# Patient Record
Sex: FEMALE | Race: WHITE | NOT HISPANIC OR LATINO | Employment: OTHER | URBAN - METROPOLITAN AREA
[De-identification: names, ages, dates, MRNs, and addresses within clinical notes are randomized per-mention and may not be internally consistent; named-entity substitution may affect disease eponyms.]

---

## 2023-03-14 DIAGNOSIS — F51.01 PRIMARY INSOMNIA: Primary | ICD-10-CM

## 2023-03-14 RX ORDER — AMITRIPTYLINE HYDROCHLORIDE 10 MG/1
TABLET, FILM COATED ORAL
Qty: 180 TABLET | Refills: 0 | Status: SHIPPED | OUTPATIENT
Start: 2023-03-14 | End: 2023-06-01 | Stop reason: SDUPTHER

## 2023-04-27 ENCOUNTER — TELEPHONE (OUTPATIENT)
Dept: PRIMARY CARE | Facility: CLINIC | Age: 68
End: 2023-04-27
Payer: MEDICARE

## 2023-05-02 ENCOUNTER — OFFICE VISIT (OUTPATIENT)
Dept: PRIMARY CARE | Facility: CLINIC | Age: 68
End: 2023-05-02
Payer: MEDICARE

## 2023-05-02 VITALS — DIASTOLIC BLOOD PRESSURE: 70 MMHG | SYSTOLIC BLOOD PRESSURE: 121 MMHG

## 2023-05-02 DIAGNOSIS — R21 RASH: Primary | ICD-10-CM

## 2023-05-02 DIAGNOSIS — L98.9 SKIN LESION: ICD-10-CM

## 2023-05-02 PROCEDURE — 99213 OFFICE O/P EST LOW 20 MIN: CPT | Performed by: STUDENT IN AN ORGANIZED HEALTH CARE EDUCATION/TRAINING PROGRAM

## 2023-05-02 RX ORDER — TRIAMCINOLONE ACETONIDE 1 MG/G
CREAM TOPICAL 2 TIMES DAILY
Qty: 30 G | Refills: 0 | Status: SHIPPED | OUTPATIENT
Start: 2023-05-02

## 2023-05-02 NOTE — PROGRESS NOTES
Subjective   Patient ID: Eh Lee is a 68 y.o. female who presents for rash.    HPI   Sick visit.  She says that she has had a rash on her nose and b/l cheeks for a couple of weeks, not improving. Area is red, dry. No pain. No drainage. No vesicles. No recent change in meds. No change in soap/moisturizers/make-up. Not itchy.  Review of Systems  12-point ROS reviewed and was negative unless otherwise noted in HPI.    Objective   121/70    Physical Exam  GEN: conversant, NAD  HEENT: PERRL, EOMI, wearing a mask  NECK: supple  PULM: unlabored breathing  ABD: ND  NEURO: no new gross focal deficits  EXT: no sig LE edema  PSYCH: appropriate affect  SKIN: mildly erythematous rash on cheeks b/l, not involving eyelids    Assessment/Plan     #Rash: start trial of triamcinolone cream, discussed SE profile, appropriate frequency and duration of use. Referral to dermatology.    RTC 1 mo for routine visit

## 2023-06-01 ENCOUNTER — OFFICE VISIT (OUTPATIENT)
Dept: PRIMARY CARE | Facility: CLINIC | Age: 68
End: 2023-06-01
Payer: MEDICARE

## 2023-06-01 ENCOUNTER — LAB (OUTPATIENT)
Dept: LAB | Facility: LAB | Age: 68
End: 2023-06-01
Payer: MEDICARE

## 2023-06-01 VITALS
BODY MASS INDEX: 19.6 KG/M2 | SYSTOLIC BLOOD PRESSURE: 126 MMHG | HEIGHT: 71 IN | WEIGHT: 140 LBS | DIASTOLIC BLOOD PRESSURE: 70 MMHG

## 2023-06-01 DIAGNOSIS — Z00.00 HEALTHCARE MAINTENANCE: ICD-10-CM

## 2023-06-01 DIAGNOSIS — F51.01 PRIMARY INSOMNIA: ICD-10-CM

## 2023-06-01 DIAGNOSIS — F32.A DEPRESSION, UNSPECIFIED DEPRESSION TYPE: ICD-10-CM

## 2023-06-01 DIAGNOSIS — F41.9 ANXIETY: ICD-10-CM

## 2023-06-01 DIAGNOSIS — Z00.00 HEALTH CARE MAINTENANCE: ICD-10-CM

## 2023-06-01 DIAGNOSIS — Z00.00 ENCOUNTER FOR ANNUAL WELLNESS EXAM IN MEDICARE PATIENT: ICD-10-CM

## 2023-06-01 DIAGNOSIS — E78.2 MIXED HYPERLIPIDEMIA: ICD-10-CM

## 2023-06-01 DIAGNOSIS — K21.9 GASTROESOPHAGEAL REFLUX DISEASE WITHOUT ESOPHAGITIS: ICD-10-CM

## 2023-06-01 DIAGNOSIS — Z00.00 ROUTINE GENERAL MEDICAL EXAMINATION AT HEALTH CARE FACILITY: Primary | ICD-10-CM

## 2023-06-01 DIAGNOSIS — I10 HYPERTENSION, UNSPECIFIED TYPE: ICD-10-CM

## 2023-06-01 LAB
ERYTHROCYTE DISTRIBUTION WIDTH (RATIO) BY AUTOMATED COUNT: 15 % (ref 11.5–14.5)
ERYTHROCYTE MEAN CORPUSCULAR HEMOGLOBIN CONCENTRATION (G/DL) BY AUTOMATED: 30.5 G/DL (ref 32–36)
ERYTHROCYTE MEAN CORPUSCULAR VOLUME (FL) BY AUTOMATED COUNT: 104 FL (ref 80–100)
ERYTHROCYTES (10*6/UL) IN BLOOD BY AUTOMATED COUNT: 3.35 X10E12/L (ref 4–5.2)
HEMATOCRIT (%) IN BLOOD BY AUTOMATED COUNT: 34.7 % (ref 36–46)
HEMOGLOBIN (G/DL) IN BLOOD: 10.6 G/DL (ref 12–16)
LEUKOCYTES (10*3/UL) IN BLOOD BY AUTOMATED COUNT: 5.3 X10E9/L (ref 4.4–11.3)
NRBC (PER 100 WBCS) BY AUTOMATED COUNT: 0 /100 WBC (ref 0–0)
PLATELETS (10*3/UL) IN BLOOD AUTOMATED COUNT: 317 X10E9/L (ref 150–450)

## 2023-06-01 PROCEDURE — 1159F MED LIST DOCD IN RCRD: CPT | Performed by: STUDENT IN AN ORGANIZED HEALTH CARE EDUCATION/TRAINING PROGRAM

## 2023-06-01 PROCEDURE — 1036F TOBACCO NON-USER: CPT | Performed by: STUDENT IN AN ORGANIZED HEALTH CARE EDUCATION/TRAINING PROGRAM

## 2023-06-01 PROCEDURE — 1170F FXNL STATUS ASSESSED: CPT | Performed by: STUDENT IN AN ORGANIZED HEALTH CARE EDUCATION/TRAINING PROGRAM

## 2023-06-01 PROCEDURE — 85027 COMPLETE CBC AUTOMATED: CPT

## 2023-06-01 PROCEDURE — G0439 PPPS, SUBSEQ VISIT: HCPCS | Performed by: STUDENT IN AN ORGANIZED HEALTH CARE EDUCATION/TRAINING PROGRAM

## 2023-06-01 PROCEDURE — 3074F SYST BP LT 130 MM HG: CPT | Performed by: STUDENT IN AN ORGANIZED HEALTH CARE EDUCATION/TRAINING PROGRAM

## 2023-06-01 PROCEDURE — 99214 OFFICE O/P EST MOD 30 MIN: CPT | Performed by: STUDENT IN AN ORGANIZED HEALTH CARE EDUCATION/TRAINING PROGRAM

## 2023-06-01 PROCEDURE — 80061 LIPID PANEL: CPT

## 2023-06-01 PROCEDURE — 84443 ASSAY THYROID STIM HORMONE: CPT

## 2023-06-01 PROCEDURE — 36415 COLL VENOUS BLD VENIPUNCTURE: CPT

## 2023-06-01 PROCEDURE — 1160F RVW MEDS BY RX/DR IN RCRD: CPT | Performed by: STUDENT IN AN ORGANIZED HEALTH CARE EDUCATION/TRAINING PROGRAM

## 2023-06-01 PROCEDURE — 80053 COMPREHEN METABOLIC PANEL: CPT

## 2023-06-01 PROCEDURE — 3078F DIAST BP <80 MM HG: CPT | Performed by: STUDENT IN AN ORGANIZED HEALTH CARE EDUCATION/TRAINING PROGRAM

## 2023-06-01 RX ORDER — BUPROPION HYDROCHLORIDE 75 MG/1
75 TABLET ORAL 2 TIMES DAILY
Qty: 180 TABLET | Refills: 1 | Status: SHIPPED | OUTPATIENT
Start: 2023-06-01 | End: 2023-10-03 | Stop reason: SDUPTHER

## 2023-06-01 RX ORDER — OMEPRAZOLE 40 MG/1
40 CAPSULE, DELAYED RELEASE ORAL
COMMUNITY
Start: 2012-09-26 | End: 2023-06-01 | Stop reason: SDUPTHER

## 2023-06-01 RX ORDER — METHOTREXATE 2.5 MG/1
TABLET ORAL
COMMUNITY
Start: 2017-01-03 | End: 2024-01-11

## 2023-06-01 RX ORDER — TRIAMTERENE/HYDROCHLOROTHIAZID 37.5-25 MG
0.5 TABLET ORAL DAILY
Qty: 45 TABLET | Refills: 1 | Status: SHIPPED | OUTPATIENT
Start: 2023-06-01 | End: 2023-08-14

## 2023-06-01 RX ORDER — DILTIAZEM HYDROCHLORIDE 180 MG/1
180 CAPSULE, EXTENDED RELEASE ORAL EVERY MORNING
Qty: 90 CAPSULE | Refills: 1 | Status: SHIPPED | OUTPATIENT
Start: 2023-06-01 | End: 2023-08-14

## 2023-06-01 RX ORDER — TOFACITINIB 11 MG/1
11 TABLET, FILM COATED, EXTENDED RELEASE ORAL DAILY
COMMUNITY
End: 2024-01-19 | Stop reason: SDUPTHER

## 2023-06-01 RX ORDER — BUPROPION HYDROCHLORIDE 75 MG/1
75 TABLET ORAL 2 TIMES DAILY
COMMUNITY
End: 2023-06-01 | Stop reason: SDUPTHER

## 2023-06-01 RX ORDER — AMITRIPTYLINE HYDROCHLORIDE 10 MG/1
20 TABLET, FILM COATED ORAL NIGHTLY
Qty: 180 TABLET | Refills: 0 | Status: SHIPPED | OUTPATIENT
Start: 2023-06-01 | End: 2023-10-03 | Stop reason: SDUPTHER

## 2023-06-01 RX ORDER — OMEPRAZOLE 40 MG/1
40 CAPSULE, DELAYED RELEASE ORAL
Qty: 90 CAPSULE | Refills: 1 | Status: SHIPPED | OUTPATIENT
Start: 2023-06-01 | End: 2023-08-14

## 2023-06-01 RX ORDER — FLUOXETINE HYDROCHLORIDE 40 MG/1
40 CAPSULE ORAL DAILY
Qty: 90 CAPSULE | Refills: 1 | Status: SHIPPED | OUTPATIENT
Start: 2023-06-01 | End: 2023-10-03 | Stop reason: SDUPTHER

## 2023-06-01 RX ORDER — ALPRAZOLAM 0.5 MG/1
0.5 TABLET ORAL NIGHTLY PRN
COMMUNITY
Start: 2012-11-12 | End: 2023-10-03 | Stop reason: SDUPTHER

## 2023-06-01 ASSESSMENT — ENCOUNTER SYMPTOMS
OCCASIONAL FEELINGS OF UNSTEADINESS: 0
LOSS OF SENSATION IN FEET: 0
DEPRESSION: 0

## 2023-06-01 ASSESSMENT — ACTIVITIES OF DAILY LIVING (ADL)
DRESSING: INDEPENDENT
GROCERY_SHOPPING: INDEPENDENT
BATHING: INDEPENDENT
TAKING_MEDICATION: INDEPENDENT
MANAGING_FINANCES: INDEPENDENT
DOING_HOUSEWORK: INDEPENDENT

## 2023-06-01 NOTE — PROGRESS NOTES
"Subjective   Patient ID: Eh Lee is a 68 y.o. female who presents for Medicare Annual Wellness Visit Subsequent (Med refill).    HPI   Here for follow up. Feels that the rash did not improve with steroids, will see dermatology soon. No longer follows with support group as it fell through, feels well supported with family members and dog. No other issues. Doing her best everyday.    Review of Systems  10 point systems reviewed and negative unless otherwise noted above in HPI    Objective   /70   Ht 1.803 m (5' 11\")   Wt 63.5 kg (140 lb)   BMI 19.53 kg/m²     Physical Exam  Constitutional: No acute distress, pleasant, AOx3  Head: Normocephalic, atraumatic  Eyes: EOMI  Ears: TM Pearly grey light reflex present bilaterally   Cardio: RRR, no gallop or rub   Pulm: CTAB, no wheezes, rales or rhonchi   Abd: Normal bowel sounds, no tenderness to palpation in all 4 quadrant, no masses palpated  Extremities: No edema, atraumatic   Psych: Appropriate mood and affect    Assessment/Plan     #Cervicalgia: Saw occupational therapy, has seen improvement. Has not seen PT.     #Depression:  -continue fluoxetine  -Continue Wellbutrin 75mg BID  -lost follow up to support group for people who lost spouses, patient  passed away 10/2021, feels well supported with family members and dog     #Paroxysmal Afib   #HLD  -no sxs currently   -Will order cardiac calcium score for risk stratification.   -She has not seen her cardiologist Dr. Alfaro for years, again advised f/u.   -She declines anticoagulation for paroxysmal afib, educated on risks     #HTN  -stable on current meds      #RA   -sees Dr. Flores     #osteoporosis   -declines treatment other than calcium-vit D  -has had DEXA with rheum     #anemia  -iron panel WNL  -may be secondary to inflammatory disease vs MTX      #anxiety   -uses alprazolam with good results  -does not need refill presently  -CSA 11/2022    #Rash: triamcinolone cream with no improvement, will " trial sunscreen for time being and regular skin care routine. Referral to dermatology upcoming.      #Health maintenance: Colonoscopy done 2022, 10 year f/u advised. Mammogram UTD 2022. She is receiving Shingrix and COVID-19 vaccinations. Pneumovax given 2022. Follows with ophthalmology. Advised yearly flu shot.     RTC 3-4 months

## 2023-06-01 NOTE — PROGRESS NOTES
"Subjective   Reason for Visit: Eh Lee is an 68 y.o. female here for a Medicare Wellness visit.     Past Medical, Surgical, and Family History reviewed and updated in chart.    Reviewed all medications by prescribing practitioner or clinical pharmacist (such as prescriptions, OTCs, herbal therapies and supplements) and documented in the medical record.    HPI    Patient Care Team:  Wayne Ny MD as PCP - General     Review of Systems    Objective   Vitals:  /70   Ht 1.803 m (5' 11\")   Wt 63.5 kg (140 lb)   BMI 19.53 kg/m²       Physical Exam    Assessment/Plan   Problem List Items Addressed This Visit    None  Visit Diagnoses       Routine general medical examination at health care facility    -  Primary    Primary insomnia        Hypertension, unspecified type        Anxiety            Trainee role: Resident    I saw and evaluated the patient. I personally obtained the key and critical portions of the history and physical exam or was physically present for key and critical portions performed by the trainee. I reviewed the trainee's documentation and discussed the patient with the trainee. I agree with the trainee's medical decision making as documented on the trainee's notes.    Wayne Ny M.D.         "

## 2023-06-02 LAB
ALANINE AMINOTRANSFERASE (SGPT) (U/L) IN SER/PLAS: 11 U/L (ref 7–45)
ALBUMIN (G/DL) IN SER/PLAS: 4.3 G/DL (ref 3.4–5)
ALKALINE PHOSPHATASE (U/L) IN SER/PLAS: 73 U/L (ref 33–136)
ANION GAP IN SER/PLAS: 11 MMOL/L (ref 10–20)
ASPARTATE AMINOTRANSFERASE (SGOT) (U/L) IN SER/PLAS: 18 U/L (ref 9–39)
BILIRUBIN TOTAL (MG/DL) IN SER/PLAS: 0.3 MG/DL (ref 0–1.2)
CALCIUM (MG/DL) IN SER/PLAS: 9.7 MG/DL (ref 8.6–10.6)
CARBON DIOXIDE, TOTAL (MMOL/L) IN SER/PLAS: 29 MMOL/L (ref 21–32)
CHLORIDE (MMOL/L) IN SER/PLAS: 104 MMOL/L (ref 98–107)
CHOLESTEROL (MG/DL) IN SER/PLAS: 233 MG/DL (ref 0–199)
CHOLESTEROL IN HDL (MG/DL) IN SER/PLAS: 103.6 MG/DL
CHOLESTEROL/HDL RATIO: 2.2
CREATININE (MG/DL) IN SER/PLAS: 0.91 MG/DL (ref 0.5–1.05)
GFR FEMALE: 69 ML/MIN/1.73M2
GLUCOSE (MG/DL) IN SER/PLAS: 93 MG/DL (ref 74–99)
LDL: 118 MG/DL (ref 0–99)
POTASSIUM (MMOL/L) IN SER/PLAS: 4.1 MMOL/L (ref 3.5–5.3)
PROTEIN TOTAL: 7.1 G/DL (ref 6.4–8.2)
SODIUM (MMOL/L) IN SER/PLAS: 140 MMOL/L (ref 136–145)
THYROTROPIN (MIU/L) IN SER/PLAS BY DETECTION LIMIT <= 0.05 MIU/L: 1.81 MIU/L (ref 0.44–3.98)
TRIGLYCERIDE (MG/DL) IN SER/PLAS: 59 MG/DL (ref 0–149)
UREA NITROGEN (MG/DL) IN SER/PLAS: 14 MG/DL (ref 6–23)
VLDL: 12 MG/DL (ref 0–40)

## 2023-06-13 DIAGNOSIS — F32.A DEPRESSION, UNSPECIFIED DEPRESSION TYPE: ICD-10-CM

## 2023-06-14 LAB
ALANINE AMINOTRANSFERASE (SGPT) (U/L) IN SER/PLAS: 13 U/L (ref 7–45)
ALBUMIN (G/DL) IN SER/PLAS: 4.6 G/DL (ref 3.4–5)
ALKALINE PHOSPHATASE (U/L) IN SER/PLAS: 84 U/L (ref 33–136)
ANION GAP IN SER/PLAS: 11 MMOL/L (ref 10–20)
ASPARTATE AMINOTRANSFERASE (SGOT) (U/L) IN SER/PLAS: 23 U/L (ref 9–39)
BASOPHILS (10*3/UL) IN BLOOD BY AUTOMATED COUNT: 0.05 X10E9/L (ref 0–0.1)
BASOPHILS/100 LEUKOCYTES IN BLOOD BY AUTOMATED COUNT: 1.3 % (ref 0–2)
BILIRUBIN TOTAL (MG/DL) IN SER/PLAS: 0.3 MG/DL (ref 0–1.2)
C REACTIVE PROTEIN (MG/L) IN SER/PLAS: <0.1 MG/DL
CALCIUM (MG/DL) IN SER/PLAS: 10 MG/DL (ref 8.6–10.6)
CARBON DIOXIDE, TOTAL (MMOL/L) IN SER/PLAS: 31 MMOL/L (ref 21–32)
CHLORIDE (MMOL/L) IN SER/PLAS: 101 MMOL/L (ref 98–107)
CREATININE (MG/DL) IN SER/PLAS: 0.96 MG/DL (ref 0.5–1.05)
EOSINOPHILS (10*3/UL) IN BLOOD BY AUTOMATED COUNT: 0.05 X10E9/L (ref 0–0.7)
EOSINOPHILS/100 LEUKOCYTES IN BLOOD BY AUTOMATED COUNT: 1.3 % (ref 0–6)
ERYTHROCYTE DISTRIBUTION WIDTH (RATIO) BY AUTOMATED COUNT: 14.6 % (ref 11.5–14.5)
ERYTHROCYTE MEAN CORPUSCULAR HEMOGLOBIN CONCENTRATION (G/DL) BY AUTOMATED: 31.7 G/DL (ref 32–36)
ERYTHROCYTE MEAN CORPUSCULAR VOLUME (FL) BY AUTOMATED COUNT: 100 FL (ref 80–100)
ERYTHROCYTES (10*6/UL) IN BLOOD BY AUTOMATED COUNT: 3.62 X10E12/L (ref 4–5.2)
GFR FEMALE: 65 ML/MIN/1.73M2
GLUCOSE (MG/DL) IN SER/PLAS: 84 MG/DL (ref 74–99)
HEMATOCRIT (%) IN BLOOD BY AUTOMATED COUNT: 36.3 % (ref 36–46)
HEMOGLOBIN (G/DL) IN BLOOD: 11.5 G/DL (ref 12–16)
IMMATURE GRANULOCYTES/100 LEUKOCYTES IN BLOOD BY AUTOMATED COUNT: 0.3 % (ref 0–0.9)
LEUKOCYTES (10*3/UL) IN BLOOD BY AUTOMATED COUNT: 4 X10E9/L (ref 4.4–11.3)
LYMPHOCYTES (10*3/UL) IN BLOOD BY AUTOMATED COUNT: 0.73 X10E9/L (ref 1.2–4.8)
LYMPHOCYTES/100 LEUKOCYTES IN BLOOD BY AUTOMATED COUNT: 18.4 % (ref 13–44)
MONOCYTES (10*3/UL) IN BLOOD BY AUTOMATED COUNT: 0.52 X10E9/L (ref 0.1–1)
MONOCYTES/100 LEUKOCYTES IN BLOOD BY AUTOMATED COUNT: 13.1 % (ref 2–10)
NEUTROPHILS (10*3/UL) IN BLOOD BY AUTOMATED COUNT: 2.6 X10E9/L (ref 1.2–7.7)
NEUTROPHILS/100 LEUKOCYTES IN BLOOD BY AUTOMATED COUNT: 65.6 % (ref 40–80)
NRBC (PER 100 WBCS) BY AUTOMATED COUNT: 0 /100 WBC (ref 0–0)
PLATELETS (10*3/UL) IN BLOOD AUTOMATED COUNT: 431 X10E9/L (ref 150–450)
POTASSIUM (MMOL/L) IN SER/PLAS: 4.2 MMOL/L (ref 3.5–5.3)
PROTEIN TOTAL: 7.5 G/DL (ref 6.4–8.2)
SEDIMENTATION RATE, ERYTHROCYTE: 6 MM/H (ref 0–30)
SODIUM (MMOL/L) IN SER/PLAS: 139 MMOL/L (ref 136–145)
UREA NITROGEN (MG/DL) IN SER/PLAS: 12 MG/DL (ref 6–23)

## 2023-06-20 LAB
ANTI-DNA (DS): <1 IU/ML
ANTI-NUCLEAR ANTIBODY (ANA): NEGATIVE
ANTI-SSA: <0.2 AI
ANTI-SSB: <0.2 AI

## 2023-08-09 DIAGNOSIS — K21.9 GASTROESOPHAGEAL REFLUX DISEASE WITHOUT ESOPHAGITIS: ICD-10-CM

## 2023-08-09 DIAGNOSIS — I10 HYPERTENSION, UNSPECIFIED TYPE: ICD-10-CM

## 2023-08-14 RX ORDER — TRIAMTERENE/HYDROCHLOROTHIAZID 37.5-25 MG
0.5 TABLET ORAL DAILY
Qty: 50 TABLET | Refills: 0 | Status: SHIPPED | OUTPATIENT
Start: 2023-08-14 | End: 2023-10-03 | Stop reason: SDUPTHER

## 2023-08-14 RX ORDER — DILTIAZEM HYDROCHLORIDE 180 MG/1
180 CAPSULE, EXTENDED RELEASE ORAL EVERY MORNING
Qty: 100 CAPSULE | Refills: 0 | Status: SHIPPED | OUTPATIENT
Start: 2023-08-14 | End: 2023-10-03 | Stop reason: SDUPTHER

## 2023-08-14 RX ORDER — OMEPRAZOLE 40 MG/1
40 CAPSULE, DELAYED RELEASE ORAL
Qty: 100 CAPSULE | Refills: 0 | Status: SHIPPED | OUTPATIENT
Start: 2023-08-14 | End: 2023-10-03 | Stop reason: SDUPTHER

## 2023-09-08 PROBLEM — I25.10 3-VESSEL CAD: Status: ACTIVE | Noted: 2023-09-08

## 2023-09-08 PROBLEM — D64.9 ANEMIA: Status: ACTIVE | Noted: 2023-09-08

## 2023-09-08 PROBLEM — F32.A DEPRESSION: Status: ACTIVE | Noted: 2023-09-08

## 2023-09-08 PROBLEM — I10 HYPERTENSION, BENIGN: Status: ACTIVE | Noted: 2023-09-08

## 2023-09-08 PROBLEM — G45.9 TIA (TRANSIENT ISCHEMIC ATTACK): Status: ACTIVE | Noted: 2023-09-08

## 2023-09-08 PROBLEM — H90.3 BILATERAL SENSORINEURAL HEARING LOSS: Status: ACTIVE | Noted: 2023-09-08

## 2023-09-08 PROBLEM — M05.79 RHEUMATOID ARTHRITIS OF MULTIPLE SITES WITHOUT ORGAN OR SYSTEM INVOLVEMENT WITH POSITIVE RHEUMATOID FACTOR (MULTI): Status: ACTIVE | Noted: 2023-09-08

## 2023-09-08 PROBLEM — E78.2 HYPERLIPIDEMIA, MIXED: Status: ACTIVE | Noted: 2023-09-08

## 2023-09-08 PROBLEM — Z79.622 LONG-TERM CURRENT USE OF TOFACITINIB: Status: ACTIVE | Noted: 2023-09-08

## 2023-09-08 PROBLEM — H81.10 BPPV (BENIGN PAROXYSMAL POSITIONAL VERTIGO): Status: ACTIVE | Noted: 2023-09-08

## 2023-09-08 PROBLEM — F43.21 GRIEF REACTION: Status: ACTIVE | Noted: 2023-09-08

## 2023-09-08 PROBLEM — K58.1 IRRITABLE BOWEL SYNDROME WITH PREDOMINANT CONSTIPATION: Status: ACTIVE | Noted: 2023-09-08

## 2023-09-08 PROBLEM — M81.0 OSTEOPOROSIS: Status: ACTIVE | Noted: 2023-09-08

## 2023-09-08 PROBLEM — I48.0 PAROXYSMAL A-FIB (MULTI): Status: ACTIVE | Noted: 2023-09-08

## 2023-09-08 PROBLEM — K21.9 GERD (GASTROESOPHAGEAL REFLUX DISEASE): Status: ACTIVE | Noted: 2023-09-08

## 2023-09-08 PROBLEM — F41.9 ANXIETY: Status: ACTIVE | Noted: 2023-09-08

## 2023-09-08 PROBLEM — M54.2 CERVICALGIA: Status: ACTIVE | Noted: 2023-09-08

## 2023-09-08 PROBLEM — M18.12 PRIMARY OSTEOARTHRITIS OF FIRST CARPOMETACARPAL JOINT OF LEFT HAND: Status: ACTIVE | Noted: 2023-09-08

## 2023-09-08 PROBLEM — H69.91 DYSFUNCTION OF RIGHT EUSTACHIAN TUBE: Status: ACTIVE | Noted: 2023-09-08

## 2023-09-08 PROBLEM — H91.91 HEARING PROBLEM OF RIGHT EAR: Status: ACTIVE | Noted: 2023-09-08

## 2023-09-08 PROBLEM — F43.20 GRIEF REACTION: Status: ACTIVE | Noted: 2023-09-08

## 2023-09-08 PROBLEM — G43.009 MIGRAINE WITHOUT AURA AND WITHOUT STATUS MIGRAINOSUS, NOT INTRACTABLE: Status: ACTIVE | Noted: 2023-09-08

## 2023-09-08 PROBLEM — Z79.631 ON METHOTREXATE THERAPY: Status: ACTIVE | Noted: 2023-09-08

## 2023-09-08 RX ORDER — LYSINE HCL 500 MG
TABLET ORAL
COMMUNITY
Start: 2014-12-11

## 2023-09-08 RX ORDER — VALACYCLOVIR HYDROCHLORIDE 1 G/1
1 TABLET, FILM COATED ORAL 3 TIMES DAILY
COMMUNITY
Start: 2022-07-28 | End: 2024-04-14 | Stop reason: ALTCHOICE

## 2023-09-08 RX ORDER — RIZATRIPTAN BENZOATE 10 MG/1
10 TABLET ORAL
COMMUNITY
End: 2024-04-18 | Stop reason: WASHOUT

## 2023-09-08 RX ORDER — ASPIRIN 325 MG
325 TABLET ORAL DAILY
COMMUNITY
Start: 2014-12-11

## 2023-09-08 RX ORDER — POLYETHYLENE GLYCOL 3350, SODIUM SULFATE ANHYDROUS, SODIUM BICARBONATE, SODIUM CHLORIDE, POTASSIUM CHLORIDE 236; 22.74; 6.74; 5.86; 2.97 G/4L; G/4L; G/4L; G/4L; G/4L
POWDER, FOR SOLUTION ORAL
COMMUNITY
Start: 2022-12-05 | End: 2023-10-11 | Stop reason: SDUPTHER

## 2023-09-08 RX ORDER — CLINDAMYCIN HYDROCHLORIDE 150 MG/1
150 CAPSULE ORAL
COMMUNITY
Start: 2023-06-05 | End: 2024-04-14 | Stop reason: ALTCHOICE

## 2023-09-08 RX ORDER — METRONIDAZOLE 7.5 MG/G
CREAM TOPICAL 2 TIMES DAILY
COMMUNITY
Start: 2023-07-21

## 2023-09-08 RX ORDER — FLUTICASONE PROPIONATE 50 MCG
2 SPRAY, SUSPENSION (ML) NASAL DAILY
COMMUNITY
Start: 2020-09-15 | End: 2024-04-14 | Stop reason: ALTCHOICE

## 2023-09-08 RX ORDER — ACETAMINOPHEN 500 MG
TABLET ORAL
COMMUNITY
End: 2023-10-11 | Stop reason: SDUPTHER

## 2023-09-08 RX ORDER — MULTIVITAMIN
1 TABLET ORAL DAILY
COMMUNITY
Start: 2014-12-11

## 2023-10-03 ENCOUNTER — OFFICE VISIT (OUTPATIENT)
Dept: PRIMARY CARE | Facility: CLINIC | Age: 68
End: 2023-10-03
Payer: MEDICARE

## 2023-10-03 VITALS — WEIGHT: 140 LBS | HEIGHT: 71 IN | BODY MASS INDEX: 19.6 KG/M2

## 2023-10-03 DIAGNOSIS — K21.9 GASTROESOPHAGEAL REFLUX DISEASE WITHOUT ESOPHAGITIS: ICD-10-CM

## 2023-10-03 DIAGNOSIS — I10 HYPERTENSION, UNSPECIFIED TYPE: ICD-10-CM

## 2023-10-03 DIAGNOSIS — F41.9 ANXIETY: ICD-10-CM

## 2023-10-03 DIAGNOSIS — F32.A DEPRESSION, UNSPECIFIED DEPRESSION TYPE: ICD-10-CM

## 2023-10-03 DIAGNOSIS — F51.01 PRIMARY INSOMNIA: ICD-10-CM

## 2023-10-03 DIAGNOSIS — Z00.00 HEALTHCARE MAINTENANCE: ICD-10-CM

## 2023-10-03 PROCEDURE — 99213 OFFICE O/P EST LOW 20 MIN: CPT | Performed by: STUDENT IN AN ORGANIZED HEALTH CARE EDUCATION/TRAINING PROGRAM

## 2023-10-03 PROCEDURE — 1160F RVW MEDS BY RX/DR IN RCRD: CPT | Performed by: STUDENT IN AN ORGANIZED HEALTH CARE EDUCATION/TRAINING PROGRAM

## 2023-10-03 PROCEDURE — 1036F TOBACCO NON-USER: CPT | Performed by: STUDENT IN AN ORGANIZED HEALTH CARE EDUCATION/TRAINING PROGRAM

## 2023-10-03 PROCEDURE — 1159F MED LIST DOCD IN RCRD: CPT | Performed by: STUDENT IN AN ORGANIZED HEALTH CARE EDUCATION/TRAINING PROGRAM

## 2023-10-03 PROCEDURE — 1126F AMNT PAIN NOTED NONE PRSNT: CPT | Performed by: STUDENT IN AN ORGANIZED HEALTH CARE EDUCATION/TRAINING PROGRAM

## 2023-10-03 PROCEDURE — 90686 IIV4 VACC NO PRSV 0.5 ML IM: CPT | Performed by: STUDENT IN AN ORGANIZED HEALTH CARE EDUCATION/TRAINING PROGRAM

## 2023-10-03 PROCEDURE — G0008 ADMIN INFLUENZA VIRUS VAC: HCPCS | Performed by: STUDENT IN AN ORGANIZED HEALTH CARE EDUCATION/TRAINING PROGRAM

## 2023-10-03 RX ORDER — OMEPRAZOLE 40 MG/1
40 CAPSULE, DELAYED RELEASE ORAL
Qty: 90 CAPSULE | Refills: 1 | Status: SHIPPED | OUTPATIENT
Start: 2023-10-03 | End: 2024-01-24 | Stop reason: SDUPTHER

## 2023-10-03 RX ORDER — BUPROPION HYDROCHLORIDE 75 MG/1
75 TABLET ORAL 2 TIMES DAILY
Qty: 180 TABLET | Refills: 1 | Status: SHIPPED | OUTPATIENT
Start: 2023-10-03 | End: 2024-01-24 | Stop reason: SDUPTHER

## 2023-10-03 RX ORDER — FLUOXETINE HYDROCHLORIDE 40 MG/1
40 CAPSULE ORAL DAILY
Qty: 90 CAPSULE | Refills: 1 | Status: SHIPPED | OUTPATIENT
Start: 2023-10-03 | End: 2024-01-24 | Stop reason: SDUPTHER

## 2023-10-03 RX ORDER — AMITRIPTYLINE HYDROCHLORIDE 10 MG/1
20 TABLET, FILM COATED ORAL NIGHTLY
Qty: 180 TABLET | Refills: 1 | Status: SHIPPED | OUTPATIENT
Start: 2023-10-03 | End: 2024-01-24 | Stop reason: SDUPTHER

## 2023-10-03 RX ORDER — ALPRAZOLAM 0.5 MG/1
0.5 TABLET ORAL NIGHTLY PRN
Qty: 6 TABLET | Refills: 0 | Status: SHIPPED | OUTPATIENT
Start: 2023-10-03 | End: 2024-05-23 | Stop reason: SDUPTHER

## 2023-10-03 RX ORDER — DILTIAZEM HYDROCHLORIDE 180 MG/1
180 CAPSULE, EXTENDED RELEASE ORAL EVERY MORNING
Qty: 90 CAPSULE | Refills: 1 | Status: SHIPPED | OUTPATIENT
Start: 2023-10-03 | End: 2024-01-24 | Stop reason: SDUPTHER

## 2023-10-03 RX ORDER — TRIAMTERENE/HYDROCHLOROTHIAZID 37.5-25 MG
0.5 TABLET ORAL DAILY
Qty: 45 TABLET | Refills: 1 | Status: SHIPPED | OUTPATIENT
Start: 2023-10-03 | End: 2024-01-24 | Stop reason: SDUPTHER

## 2023-10-03 NOTE — PROGRESS NOTES
"Subjective   Patient ID: Eh Lee is a 68 y.o. female who presents for Follow-up (Med refill).    HPI   Routine fu.    Pt is presenting to the clinic for follow up. She notes she is doing better however still grieving and do endorse occasional episodes of anxiety .    Review of Systems    10 point ros negative aside form HPI    Objective   Ht 1.803 m (5' 11\")   Wt 63.5 kg (140 lb)   BMI 19.53 kg/m²     Physical Exam    Physical Exam:  General:  Pleasant and cooperative. No apparent distress.  HEENT:  Normocephalic, atraumatic, mucus membranes moist.   Neck:  Trachea midline.  No JVD.    Chest:  Clear to auscultation bilaterally. No wheezes, rales, or rhonchi.  CV:  Regular rate and rhythm.  Positive S1/S2.   Abdomen: Bowel sounds present in all four quadrants, abdomen is soft, non-tender, non-distended.  Extremities:  No lower extremity edema or cyanosis.   Neurological:  AAOx3. No focal deficits.  Skin:  Warm and dry.      Assessment/Plan     #Cervicalgia: Saw occupational therapy, has seen improvement. Has not seen PT.     #Depression:  -continue fluoxetine  -Continue Wellbutrin 75mg BID  -lost follow up to support group for people who lost spouses, patient  passed away 10/2021, feels well supported with family members and dog    #Anxiety  -Prescribe 6 day supply of alprazolam as needed for anxiety. Discussed side effect profile and if pt is having persistent symptoms to come back to clinic. I personally reviewed OARRS.     #Paroxysmal Afib   #HLD  -no sxs currently   -Will order cardiac calcium score for risk stratification.   -She has not seen her cardiologist Dr. Alfaro for years, again advised f/u. Pt states she will make appointment today  -She declines anticoagulation for paroxysmal afib, educated on risks     #HTN  -stable on current meds      #RA   -sees Dr. Flores     #osteoporosis   -declines treatment other than calcium-vit D  -has had DEXA with rheum     #anemia  -iron panel WNL  -may " be secondary to inflammatory disease vs MTX      #anxiety   -uses alprazolam with good results  -does not need refill presently  -CSA 11/2022     #Rash: triamcinolone cream with no improvement, will trial sunscreen for time being and regular skin care routine. Pt is seeing dermatologist      #Health maintenance: Colonoscopy done 2022, 10 year f/u advised. Mammogram ordered . She is receiving Shingrix and COVID-19 vaccinations. Pneumovax given 2022. Follows with ophthalmology. Advised yearly flu shot. Advised COVID-19 booster and RSV vaccine.      RTC 3-4 months    Dr. Micah Marie   Internal Medicine PGY-2    Trainee role: Resident    I saw and evaluated the patient. I personally obtained the key and critical portions of the history and physical exam or was physically present for key and critical portions performed by the trainee. I reviewed the trainee's documentation and discussed the patient with the trainee. I agree with the trainee's medical decision making as documented on the trainee's notes.    Wayne Ny M.D.

## 2023-10-11 NOTE — PROGRESS NOTES
Subjective   Patient ID: Eh Lee is a 68 y.o. female who presents for Rheumatoid Arthritis.  HPI  RA has been doing well.  No pain, stiffness or swelling.  No recurrence of nodules.   Weather change has not affected her.  Pt brought forms for xeljanz patient assistance and we filled them out and faxed during appointment today    Patient Active Problem List    Diagnosis Date Noted    Tinnitus 10/12/2023    Protein-calorie malnutrition, unspecified severity (CMS/HCC) 10/12/2023    3-vessel CAD 09/08/2023    Anemia 09/08/2023    Anxiety 09/08/2023    Bilateral sensorineural hearing loss 09/08/2023    BPPV (benign paroxysmal positional vertigo) 09/08/2023    Cervicalgia 09/08/2023    Depression 09/08/2023    Dysfunction of right eustachian tube 09/08/2023    GERD (gastroesophageal reflux disease) 09/08/2023    Hearing problem of right ear 09/08/2023    Hyperlipidemia, mixed 09/08/2023    Hypertension, benign 09/08/2023    Irritable bowel syndrome with predominant constipation 09/08/2023    Long-term current use of tofacitinib 09/08/2023    Migraine without aura and without status migrainosus, not intractable 09/08/2023    On methotrexate therapy 09/08/2023    Osteoporosis 09/08/2023    Paroxysmal A-fib (CMS/[facility]) 09/08/2023    Primary osteoarthritis of first carpometacarpal joint of left hand 09/08/2023    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 (CMS/[facility]) 09/08/2023    TIA (transient ischemic attack) 09/08/2023     Current Outpatient Medications   Medication Instructions    ALPRAZolam (XANAX) 0.5 mg, oral, Nightly PRN    amitriptyline (ELAVIL) 20 mg, oral, Nightly    aspirin 325 mg, oral, Daily    Bacillus subtilis-inulin 1.5 billion cell-1 gram tablet,chewable TAKE PER DIRECTED    buPROPion (WELLBUTRIN) 75 mg, oral, 2 times daily    calcium carbonate-vit D3-min 600 mg calcium- 400 unit tablet oral, TAKE PER DIRECTED    clindamycin (CLEOCIN) 150 mg,  "oral, PER DIRECTED    dilTIAZem ER (TIAZAC) 180 mg, oral, Every morning    FLUoxetine (PROZAC) 40 mg, oral, Daily    fluticasone (Flonase) 50 mcg/actuation nasal spray 2 sprays, nasal, Daily    L. acidophilus/Bifid. animalis 15.5 billion cell capsule 1 capsule, oral, Daily    Lactobacillus acidophilus 100 million cell capsule PER DIRECTED    methotrexate (Trexall) 2.5 mg tablet oral    metroNIDAZOLE (Metrocream) 0.75 % cream Topical, 2 times daily, to face    multivitamin (Daily Multi-Vitamin) tablet 1 tablet, oral, Daily    omeprazole (PRILOSEC) 40 mg, oral, Daily before breakfast    polyethylene glycol 3350 (MIRALAX ORAL) oral, ONCE DAILY PER DIRECTED    rizatriptan (MAXALT) 10 mg, oral, PER DIRECTED    triamcinolone (Kenalog) 0.1 % cream Topical, 2 times daily, Apply to affected area 1-2 times daily as needed.    triamterene-hydrochlorothiazid (Maxzide-25) 37.5-25 mg tablet 0.5 tablets, oral, Daily    valACYclovir (Valtrex) 1 gram tablet 1 tablet, oral, 3 times daily    Xeljanz XR 11 mg, oral, Daily, Do not crush, chew or split. Swallow whole.       Review of Systems   Constitutional:  Negative for fever and unexpected weight change.   HENT:  Negative for congestion.    Respiratory:  Negative for cough and shortness of breath.    Cardiovascular:  Negative for leg swelling.   Musculoskeletal:  Negative for back pain.   Skin:  Negative for color change and rash.   Neurological:  Negative for dizziness, weakness, numbness and headaches.   Hematological:  Does not bruise/bleed easily.       Objective  /71   Pulse 61   Temp 36.2 °C (97.1 °F)   Ht 1.803 m (5' 11\")   Wt 65.1 kg (143 lb 9.6 oz)   BMI 20.03 kg/m²     Physical Exam  Vitals reviewed.   Constitutional:       General: She is not in acute distress.     Appearance: Normal appearance.   HENT:      Head: Normocephalic and atraumatic.   Eyes:      Conjunctiva/sclera: Conjunctivae normal.   Pulmonary:      Effort: Pulmonary effort is normal. "   Musculoskeletal:         General: No swelling, tenderness or deformity.      Right lower leg: No edema.      Left lower leg: No edema.      Comments: No synovitis     Skin:     Findings: No erythema or rash.   Neurological:      General: No focal deficit present.      Mental Status: She is alert.   Psychiatric:         Mood and Affect: Mood normal.         Assessment/Plan   Problem List Items Addressed This Visit             ICD-10-CM    Long-term current use of tofacitinib Z79.622    On methotrexate therapy Z79.631    Paroxysmal A-fib (CMS/[facility]) I48.0     Stable   followed by PCP/card         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 (CMS/[facility]) - Primary M05.79     RA on methotrexate and xeljanz.  Pt stable without any signs of disease progression on exam  No recurrence of rheumatoid nodules.  Labs ordered to assess disease activity         Relevant Orders    CBC and Auto Differential    Comprehensive Metabolic Panel    C-Reactive Protein    Sedimentation Rate    Follow Up In Rheumatology    Protein-calorie malnutrition, unspecified severity (CMS/HCC) E46     Monitored by PCP             Patient was identified as a fall risk. Risk prevention instructions provided.

## 2023-10-12 ENCOUNTER — LAB (OUTPATIENT)
Dept: LAB | Facility: LAB | Age: 68
End: 2023-10-12
Payer: MEDICARE

## 2023-10-12 ENCOUNTER — OFFICE VISIT (OUTPATIENT)
Dept: RHEUMATOLOGY | Facility: CLINIC | Age: 68
End: 2023-10-12
Payer: MEDICARE

## 2023-10-12 VITALS
TEMPERATURE: 97.1 F | DIASTOLIC BLOOD PRESSURE: 71 MMHG | BODY MASS INDEX: 20.1 KG/M2 | SYSTOLIC BLOOD PRESSURE: 140 MMHG | HEIGHT: 71 IN | WEIGHT: 143.6 LBS | HEART RATE: 61 BPM

## 2023-10-12 DIAGNOSIS — Z79.631 ON METHOTREXATE THERAPY: ICD-10-CM

## 2023-10-12 DIAGNOSIS — M05.79 RHEUMATOID ARTHRITIS OF MULTIPLE SITES WITHOUT ORGAN OR SYSTEM INVOLVEMENT WITH POSITIVE RHEUMATOID FACTOR (MULTI): ICD-10-CM

## 2023-10-12 DIAGNOSIS — I48.0 PAROXYSMAL A-FIB (MULTI): ICD-10-CM

## 2023-10-12 DIAGNOSIS — M05.79 RHEUMATOID ARTHRITIS OF MULTIPLE SITES WITHOUT ORGAN OR SYSTEM INVOLVEMENT WITH POSITIVE RHEUMATOID FACTOR (MULTI): Primary | ICD-10-CM

## 2023-10-12 DIAGNOSIS — E46 PROTEIN-CALORIE MALNUTRITION, UNSPECIFIED SEVERITY (MULTI): ICD-10-CM

## 2023-10-12 DIAGNOSIS — Z79.622 LONG-TERM CURRENT USE OF TOFACITINIB: ICD-10-CM

## 2023-10-12 PROBLEM — F43.21 GRIEF REACTION: Status: RESOLVED | Noted: 2023-09-08 | Resolved: 2023-10-12

## 2023-10-12 PROBLEM — H93.19 TINNITUS: Status: ACTIVE | Noted: 2023-10-12

## 2023-10-12 PROBLEM — F43.20 GRIEF REACTION: Status: RESOLVED | Noted: 2023-09-08 | Resolved: 2023-10-12

## 2023-10-12 LAB
ALBUMIN SERPL BCP-MCNC: 4.5 G/DL (ref 3.4–5)
ALP SERPL-CCNC: 73 U/L (ref 33–136)
ALT SERPL W P-5'-P-CCNC: 13 U/L (ref 7–45)
ANION GAP SERPL CALC-SCNC: 11 MMOL/L (ref 10–20)
AST SERPL W P-5'-P-CCNC: 19 U/L (ref 9–39)
BASOPHILS # BLD AUTO: 0.04 X10*3/UL (ref 0–0.1)
BASOPHILS NFR BLD AUTO: 1 %
BILIRUB SERPL-MCNC: 0.3 MG/DL (ref 0–1.2)
BUN SERPL-MCNC: 17 MG/DL (ref 6–23)
CALCIUM SERPL-MCNC: 9.8 MG/DL (ref 8.6–10.6)
CHLORIDE SERPL-SCNC: 104 MMOL/L (ref 98–107)
CO2 SERPL-SCNC: 30 MMOL/L (ref 21–32)
CREAT SERPL-MCNC: 0.85 MG/DL (ref 0.5–1.05)
CRP SERPL-MCNC: <0.1 MG/DL
EOSINOPHIL # BLD AUTO: 0.06 X10*3/UL (ref 0–0.7)
EOSINOPHIL NFR BLD AUTO: 1.5 %
ERYTHROCYTE [DISTWIDTH] IN BLOOD BY AUTOMATED COUNT: 14.7 % (ref 11.5–14.5)
ERYTHROCYTE [SEDIMENTATION RATE] IN BLOOD BY WESTERGREN METHOD: 4 MM/H (ref 0–30)
GFR SERPL CREATININE-BSD FRML MDRD: 75 ML/MIN/1.73M*2
GLUCOSE SERPL-MCNC: 76 MG/DL (ref 74–99)
HCT VFR BLD AUTO: 33.4 % (ref 36–46)
HGB BLD-MCNC: 10.4 G/DL (ref 12–16)
IMM GRANULOCYTES # BLD AUTO: 0.01 X10*3/UL (ref 0–0.7)
IMM GRANULOCYTES NFR BLD AUTO: 0.3 % (ref 0–0.9)
LYMPHOCYTES # BLD AUTO: 0.57 X10*3/UL (ref 1.2–4.8)
LYMPHOCYTES NFR BLD AUTO: 14.5 %
MCH RBC QN AUTO: 31.5 PG (ref 26–34)
MCHC RBC AUTO-ENTMCNC: 31.1 G/DL (ref 32–36)
MCV RBC AUTO: 101 FL (ref 80–100)
MONOCYTES # BLD AUTO: 0.54 X10*3/UL (ref 0.1–1)
MONOCYTES NFR BLD AUTO: 13.7 %
NEUTROPHILS # BLD AUTO: 2.72 X10*3/UL (ref 1.2–7.7)
NEUTROPHILS NFR BLD AUTO: 69 %
NRBC BLD-RTO: 0 /100 WBCS (ref 0–0)
PLATELET # BLD AUTO: 324 X10*3/UL (ref 150–450)
PMV BLD AUTO: 9.7 FL (ref 7.5–11.5)
POTASSIUM SERPL-SCNC: 4.3 MMOL/L (ref 3.5–5.3)
PROT SERPL-MCNC: 7.3 G/DL (ref 6.4–8.2)
RBC # BLD AUTO: 3.3 X10*6/UL (ref 4–5.2)
SODIUM SERPL-SCNC: 141 MMOL/L (ref 136–145)
WBC # BLD AUTO: 3.9 X10*3/UL (ref 4.4–11.3)

## 2023-10-12 PROCEDURE — 3077F SYST BP >= 140 MM HG: CPT | Performed by: INTERNAL MEDICINE

## 2023-10-12 PROCEDURE — 86140 C-REACTIVE PROTEIN: CPT

## 2023-10-12 PROCEDURE — 3078F DIAST BP <80 MM HG: CPT | Performed by: INTERNAL MEDICINE

## 2023-10-12 PROCEDURE — 99214 OFFICE O/P EST MOD 30 MIN: CPT | Performed by: INTERNAL MEDICINE

## 2023-10-12 PROCEDURE — 80053 COMPREHEN METABOLIC PANEL: CPT

## 2023-10-12 PROCEDURE — 1160F RVW MEDS BY RX/DR IN RCRD: CPT | Performed by: INTERNAL MEDICINE

## 2023-10-12 PROCEDURE — 1126F AMNT PAIN NOTED NONE PRSNT: CPT | Performed by: INTERNAL MEDICINE

## 2023-10-12 PROCEDURE — 85652 RBC SED RATE AUTOMATED: CPT

## 2023-10-12 PROCEDURE — 1159F MED LIST DOCD IN RCRD: CPT | Performed by: INTERNAL MEDICINE

## 2023-10-12 PROCEDURE — 85025 COMPLETE CBC W/AUTO DIFF WBC: CPT

## 2023-10-12 PROCEDURE — 1036F TOBACCO NON-USER: CPT | Performed by: INTERNAL MEDICINE

## 2023-10-12 PROCEDURE — 36415 COLL VENOUS BLD VENIPUNCTURE: CPT

## 2023-10-12 ASSESSMENT — PATIENT HEALTH QUESTIONNAIRE - PHQ9
2. FEELING DOWN, DEPRESSED OR HOPELESS: NOT AT ALL
SUM OF ALL RESPONSES TO PHQ9 QUESTIONS 1 AND 2: 0
1. LITTLE INTEREST OR PLEASURE IN DOING THINGS: NOT AT ALL

## 2023-10-12 ASSESSMENT — ENCOUNTER SYMPTOMS
WEAKNESS: 0
UNEXPECTED WEIGHT CHANGE: 0
HEADACHES: 0
COUGH: 0
DIZZINESS: 0
NUMBNESS: 0
BRUISES/BLEEDS EASILY: 0
COLOR CHANGE: 0
FEVER: 0
SHORTNESS OF BREATH: 0
BACK PAIN: 0

## 2023-10-12 NOTE — PATIENT INSTRUCTIONS

## 2023-10-12 NOTE — ASSESSMENT & PLAN NOTE
RA on methotrexate and xeljanz.  Pt stable without any signs of disease progression on exam  No recurrence of rheumatoid nodules.  Labs ordered to assess disease activity

## 2023-10-12 NOTE — LETTER
October 12, 2023     Wayne Ny MD  [address]  [place] [address]    Patient: Eh Lee   YOB: 1955   Date of Visit: 10/12/2023       Dear Dr. Wayne Ny MD:    Thank you for referring Eh Lee to me for evaluation. Below are my notes for this consultation.  If you have questions, please do not hesitate to call me. I look forward to following your patient along with you.       Sincerely,     Ingrid Gonzalez MD      CC: No Recipients  ______________________________________________________________________________________    Subjective  Patient ID: Eh Lee is a 68 y.o. female who presents for Rheumatoid Arthritis.  HPI  RA has been doing well.  No pain, stiffness or swelling.  No recurrence of nodules.   Weather change has not affected her.  Pt brought forms for xeljanz patient assistance and we filled them out and faxed during appointment today    Patient Active Problem List    Diagnosis Date Noted   • Tinnitus 10/12/2023   • Protein-calorie malnutrition, unspecified severity (CMS/HCC) 10/12/2023   • 3-vessel CAD 09/08/2023   • Anemia 09/08/2023   • Anxiety 09/08/2023   • Bilateral sensorineural hearing loss 09/08/2023   • BPPV (benign paroxysmal positional vertigo) 09/08/2023   • Cervicalgia 09/08/2023   • Depression 09/08/2023   • Dysfunction of right eustachian tube 09/08/2023   • GERD (gastroesophageal reflux disease) 09/08/2023   • Hearing problem of right ear 09/08/2023   • Hyperlipidemia, mixed 09/08/2023   • Hypertension, benign 09/08/2023   • Irritable bowel syndrome with predominant constipation 09/08/2023   • Long-term current use of tofacitinib 09/08/2023   • Migraine without aura and without status migrainosus, not intractable 09/08/2023   • On methotrexate therapy 09/08/2023   • Osteoporosis 09/08/2023   • Paroxysmal A-fib (CMS/[facility]) 09/08/2023   • Primary osteoarthritis of first carpometacarpal joint of left hand  09/08/2023   • 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 (CMS/[facility]) 09/08/2023   • TIA (transient ischemic attack) 09/08/2023     Current Outpatient Medications   Medication Instructions   • ALPRAZolam (XANAX) 0.5 mg, oral, Nightly PRN   • amitriptyline (ELAVIL) 20 mg, oral, Nightly   • aspirin 325 mg, oral, Daily   • Bacillus subtilis-inulin 1.5 billion cell-1 gram tablet,chewable TAKE PER DIRECTED   • buPROPion (WELLBUTRIN) 75 mg, oral, 2 times daily   • calcium carbonate-vit D3-min 600 mg calcium- 400 unit tablet oral, TAKE PER DIRECTED   • clindamycin (CLEOCIN) 150 mg, oral, PER DIRECTED   • dilTIAZem ER (TIAZAC) 180 mg, oral, Every morning   • FLUoxetine (PROZAC) 40 mg, oral, Daily   • fluticasone (Flonase) 50 mcg/actuation nasal spray 2 sprays, nasal, Daily   • L. acidophilus/Bifid. animalis 15.5 billion cell capsule 1 capsule, oral, Daily   • Lactobacillus acidophilus 100 million cell capsule PER DIRECTED   • methotrexate (Trexall) 2.5 mg tablet oral   • metroNIDAZOLE (Metrocream) 0.75 % cream Topical, 2 times daily, to face   • multivitamin (Daily Multi-Vitamin) tablet 1 tablet, oral, Daily   • omeprazole (PRILOSEC) 40 mg, oral, Daily before breakfast   • polyethylene glycol 3350 (MIRALAX ORAL) oral, ONCE DAILY PER DIRECTED   • rizatriptan (MAXALT) 10 mg, oral, PER DIRECTED   • triamcinolone (Kenalog) 0.1 % cream Topical, 2 times daily, Apply to affected area 1-2 times daily as needed.   • triamterene-hydrochlorothiazid (Maxzide-25) 37.5-25 mg tablet 0.5 tablets, oral, Daily   • valACYclovir (Valtrex) 1 gram tablet 1 tablet, oral, 3 times daily   • Xeljanz XR 11 mg, oral, Daily, Do not crush, chew or split. Swallow whole.       Review of Systems   Constitutional:  Negative for fever and unexpected weight change.   HENT:  Negative for congestion.    Respiratory:  Negative for cough and shortness of breath.    Cardiovascular:  Negative for leg  "swelling.   Musculoskeletal:  Negative for back pain.   Skin:  Negative for color change and rash.   Neurological:  Negative for dizziness, weakness, numbness and headaches.   Hematological:  Does not bruise/bleed easily.       Objective /71   Pulse 61   Temp 36.2 °C (97.1 °F)   Ht 1.803 m (5' 11\")   Wt 65.1 kg (143 lb 9.6 oz)   BMI 20.03 kg/m²     Physical Exam  Vitals reviewed.   Constitutional:       General: She is not in acute distress.     Appearance: Normal appearance.   HENT:      Head: Normocephalic and atraumatic.   Eyes:      Conjunctiva/sclera: Conjunctivae normal.   Pulmonary:      Effort: Pulmonary effort is normal.   Musculoskeletal:         General: No swelling, tenderness or deformity.      Right lower leg: No edema.      Left lower leg: No edema.      Comments: No synovitis     Skin:     Findings: No erythema or rash.   Neurological:      General: No focal deficit present.      Mental Status: She is alert.   Psychiatric:         Mood and Affect: Mood normal.         Assessment/Plan  Problem List Items Addressed This Visit             ICD-10-CM    Long-term current use of tofacitinib Z79.622    On methotrexate therapy Z79.631    Paroxysmal A-fib (CMS/HCC) I48.0     Stable   followed by PCP/card         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 (CMS/HCC) - Primary M05.79     RA on methotrexate and xeljanz.  Pt stable without any signs of disease progression on exam  No recurrence of rheumatoid nodules.  Labs ordered to assess disease activity         Relevant Orders    CBC and Auto Differential    Comprehensive Metabolic Panel    C-Reactive Protein    Sedimentation Rate    Follow Up In Rheumatology    Protein-calorie malnutrition, unspecified severity (CMS/HCC) E46     Monitored by PCP             Patient was identified as a fall risk. Risk prevention instructions provided.    "

## 2023-10-17 ENCOUNTER — ANCILLARY PROCEDURE (OUTPATIENT)
Dept: RADIOLOGY | Facility: CLINIC | Age: 68
End: 2023-10-17
Payer: MEDICARE

## 2023-10-17 DIAGNOSIS — Z00.00 HEALTHCARE MAINTENANCE: ICD-10-CM

## 2023-10-17 PROCEDURE — 77063 BREAST TOMOSYNTHESIS BI: CPT | Mod: BILATERAL PROCEDURE | Performed by: RADIOLOGY

## 2023-10-17 PROCEDURE — 77067 SCR MAMMO BI INCL CAD: CPT

## 2023-10-17 PROCEDURE — 77067 SCR MAMMO BI INCL CAD: CPT | Mod: BILATERAL PROCEDURE | Performed by: RADIOLOGY

## 2023-12-29 ENCOUNTER — TELEMEDICINE (OUTPATIENT)
Dept: PRIMARY CARE | Facility: CLINIC | Age: 68
End: 2023-12-29
Payer: MEDICARE

## 2023-12-29 VITALS — WEIGHT: 143 LBS | BODY MASS INDEX: 20.02 KG/M2 | HEIGHT: 71 IN | TEMPERATURE: 97.2 F

## 2023-12-29 DIAGNOSIS — R05.1 ACUTE COUGH: Primary | ICD-10-CM

## 2023-12-29 PROCEDURE — 99213 OFFICE O/P EST LOW 20 MIN: CPT | Performed by: STUDENT IN AN ORGANIZED HEALTH CARE EDUCATION/TRAINING PROGRAM

## 2023-12-29 RX ORDER — GUAIFENESIN 1200 MG/1
1200 TABLET, EXTENDED RELEASE ORAL 2 TIMES DAILY
Qty: 60 TABLET | Refills: 0 | Status: SHIPPED | OUTPATIENT
Start: 2023-12-29 | End: 2024-01-19 | Stop reason: ALTCHOICE

## 2023-12-29 RX ORDER — BENZONATATE 200 MG/1
200 CAPSULE ORAL NIGHTLY PRN
Qty: 30 CAPSULE | Refills: 0 | Status: SHIPPED | OUTPATIENT
Start: 2023-12-29 | End: 2024-01-19 | Stop reason: ALTCHOICE

## 2023-12-29 NOTE — PROGRESS NOTES
Subjective   Patient ID: Eh Lee is a 68 y.o. female who presents for virtual visit and Cough.  HPI  Eh presents for a virtual sick visit today.    She reports a minimally productive cough starting ~5 days ago. No significant change in the cough since onset. She does have some throat and rib discomfort 2/2 the coughing. She has been taking mucinex DM and [facility] cough/cold medications. She denies any rhinorrhea, nasal congestion, fevers, chills, body aches, SOB, CP, NVD, abdominal pain, ear pain or pressure or any known recent sick contacts. She has been able to sleep at night and appetite is intact. No other complaints at this time.    Review of Systems  12-point ROS was reviewed and is negative, unless otherwise noted in HPI    Objective   Vitals:    12/29/23 0936   Temp: 36.2 °C (97.2 °F)      Physical Exam  GEN: alert, conversant, NAD    Limited due to virtual visit    Assessment/Plan   #acute viral URI with cough  Timecourse, lack of fever, constellation of symptoms point to viral etiology  - Given script for mucinex, tessalon pearles.  - advised to stay well hydrated, resting regularly, continue supportive care measures  - Advised to call for worsening symptoms in the next 3-4 days, for any fevers/chills, or significant SOB/sputum production     I spent 23 minutes reviewing chart, visiting with patient, writing prescriptions and documenting in the chart.       Jerrell Hanks,

## 2024-01-11 DIAGNOSIS — M05.79 RHEUMATOID ARTHRITIS OF MULTIPLE SITES WITHOUT ORGAN OR SYSTEM INVOLVEMENT WITH POSITIVE RHEUMATOID FACTOR (MULTI): Primary | ICD-10-CM

## 2024-01-11 RX ORDER — METHOTREXATE 2.5 MG/1
TABLET ORAL
Qty: 48 TABLET | Refills: 3 | Status: SHIPPED | OUTPATIENT
Start: 2024-01-11 | End: 2025-01-10

## 2024-01-19 ENCOUNTER — TELEPHONE (OUTPATIENT)
Dept: RHEUMATOLOGY | Facility: CLINIC | Age: 69
End: 2024-01-19
Payer: MEDICARE

## 2024-01-19 DIAGNOSIS — M05.79 RHEUMATOID ARTHRITIS OF MULTIPLE SITES WITHOUT ORGAN OR SYSTEM INVOLVEMENT WITH POSITIVE RHEUMATOID FACTOR (MULTI): Primary | ICD-10-CM

## 2024-01-19 RX ORDER — TOFACITINIB 11 MG/1
11 TABLET, FILM COATED, EXTENDED RELEASE ORAL DAILY
Qty: 90 TABLET | Refills: 3 | Status: SHIPPED | OUTPATIENT
Start: 2024-01-19 | End: 2025-01-18

## 2024-01-19 NOTE — TELEPHONE ENCOUNTER
Rx line 11:23    [facility] - [facility] support  Ph [phone]  [address]    Tammy

## 2024-01-24 ENCOUNTER — OFFICE VISIT (OUTPATIENT)
Dept: PRIMARY CARE | Facility: CLINIC | Age: 69
End: 2024-01-24
Payer: MEDICARE

## 2024-01-24 VITALS
DIASTOLIC BLOOD PRESSURE: 79 MMHG | SYSTOLIC BLOOD PRESSURE: 122 MMHG | HEIGHT: 71 IN | WEIGHT: 141 LBS | BODY MASS INDEX: 19.74 KG/M2

## 2024-01-24 DIAGNOSIS — K21.9 GASTROESOPHAGEAL REFLUX DISEASE WITHOUT ESOPHAGITIS: ICD-10-CM

## 2024-01-24 DIAGNOSIS — B35.1 ONYCHOMYCOSIS: ICD-10-CM

## 2024-01-24 DIAGNOSIS — I10 HYPERTENSION, UNSPECIFIED TYPE: ICD-10-CM

## 2024-01-24 DIAGNOSIS — Z00.00 ROUTINE GENERAL MEDICAL EXAMINATION AT HEALTH CARE FACILITY: Primary | ICD-10-CM

## 2024-01-24 DIAGNOSIS — F41.9 ANXIETY: ICD-10-CM

## 2024-01-24 DIAGNOSIS — F32.A DEPRESSION, UNSPECIFIED DEPRESSION TYPE: ICD-10-CM

## 2024-01-24 DIAGNOSIS — F51.01 PRIMARY INSOMNIA: ICD-10-CM

## 2024-01-24 PROCEDURE — 3078F DIAST BP <80 MM HG: CPT | Performed by: STUDENT IN AN ORGANIZED HEALTH CARE EDUCATION/TRAINING PROGRAM

## 2024-01-24 PROCEDURE — 1036F TOBACCO NON-USER: CPT | Performed by: STUDENT IN AN ORGANIZED HEALTH CARE EDUCATION/TRAINING PROGRAM

## 2024-01-24 PROCEDURE — 99214 OFFICE O/P EST MOD 30 MIN: CPT | Performed by: STUDENT IN AN ORGANIZED HEALTH CARE EDUCATION/TRAINING PROGRAM

## 2024-01-24 PROCEDURE — 1126F AMNT PAIN NOTED NONE PRSNT: CPT | Performed by: STUDENT IN AN ORGANIZED HEALTH CARE EDUCATION/TRAINING PROGRAM

## 2024-01-24 PROCEDURE — 3074F SYST BP LT 130 MM HG: CPT | Performed by: STUDENT IN AN ORGANIZED HEALTH CARE EDUCATION/TRAINING PROGRAM

## 2024-01-24 PROCEDURE — 1170F FXNL STATUS ASSESSED: CPT | Performed by: STUDENT IN AN ORGANIZED HEALTH CARE EDUCATION/TRAINING PROGRAM

## 2024-01-24 PROCEDURE — 1160F RVW MEDS BY RX/DR IN RCRD: CPT | Performed by: STUDENT IN AN ORGANIZED HEALTH CARE EDUCATION/TRAINING PROGRAM

## 2024-01-24 PROCEDURE — 1159F MED LIST DOCD IN RCRD: CPT | Performed by: STUDENT IN AN ORGANIZED HEALTH CARE EDUCATION/TRAINING PROGRAM

## 2024-01-24 PROCEDURE — G0439 PPPS, SUBSEQ VISIT: HCPCS | Performed by: STUDENT IN AN ORGANIZED HEALTH CARE EDUCATION/TRAINING PROGRAM

## 2024-01-24 RX ORDER — OMEPRAZOLE 40 MG/1
40 CAPSULE, DELAYED RELEASE ORAL
Qty: 90 CAPSULE | Refills: 1 | Status: SHIPPED | OUTPATIENT
Start: 2024-01-24 | End: 2024-03-18

## 2024-01-24 RX ORDER — BUPROPION HYDROCHLORIDE 75 MG/1
75 TABLET ORAL 2 TIMES DAILY
Qty: 180 TABLET | Refills: 1 | Status: SHIPPED | OUTPATIENT
Start: 2024-01-24

## 2024-01-24 RX ORDER — AMITRIPTYLINE HYDROCHLORIDE 10 MG/1
20 TABLET, FILM COATED ORAL NIGHTLY
Qty: 180 TABLET | Refills: 1 | Status: SHIPPED | OUTPATIENT
Start: 2024-01-24 | End: 2024-05-14

## 2024-01-24 RX ORDER — FLUOXETINE HYDROCHLORIDE 40 MG/1
40 CAPSULE ORAL DAILY
Qty: 90 CAPSULE | Refills: 1 | Status: SHIPPED | OUTPATIENT
Start: 2024-01-24

## 2024-01-24 RX ORDER — TRIAMTERENE/HYDROCHLOROTHIAZID 37.5-25 MG
0.5 TABLET ORAL DAILY
Qty: 45 TABLET | Refills: 1 | Status: SHIPPED | OUTPATIENT
Start: 2024-01-24 | End: 2024-03-18

## 2024-01-24 RX ORDER — DILTIAZEM HYDROCHLORIDE 180 MG/1
180 CAPSULE, EXTENDED RELEASE ORAL EVERY MORNING
Qty: 90 CAPSULE | Refills: 1 | Status: SHIPPED | OUTPATIENT
Start: 2024-01-24 | End: 2024-03-18

## 2024-01-24 ASSESSMENT — ACTIVITIES OF DAILY LIVING (ADL)
MANAGING_FINANCES: INDEPENDENT
DRESSING: INDEPENDENT
DOING_HOUSEWORK: INDEPENDENT
GROCERY_SHOPPING: INDEPENDENT
BATHING: INDEPENDENT
TAKING_MEDICATION: INDEPENDENT

## 2024-01-24 ASSESSMENT — ENCOUNTER SYMPTOMS
DEPRESSION: 1
LOSS OF SENSATION IN FEET: 0
OCCASIONAL FEELINGS OF UNSTEADINESS: 0

## 2024-01-24 NOTE — PROGRESS NOTES
"Subjective   Patient ID: Eh Lee is a 69 y.o. female who presents for Medicare Annual Wellness Visit Subsequent, Med Refill (Elavil ([facility]); Wellbutrin( [facility]); Caridzem( [facility]); Prozac ([facility]); Prilosec([facility]); Maxzide([facility])), Nail Problem (Both Feet), and Abdominal Pain (Right lower side tender to touch).  HPI   Patient is here for medicare wellness visit and routine follow up.    She reports noting some changes to her great toenails bilateral, thickening and some yellowing changes. She is concerned about a possible toenail fungus. She reports some RLQ discomfort at times, only when she pushes on her abdomen. No NVD. Does strain regularly for bowel movements. Using miralax daily. Has some urge incontinence, she has to rush to the bathroom at times.     Review of Systems  10 point ros negative aside form [facility]    Objective   /79   Ht 1.803 m (5' 11\")   Wt 64 kg (141 lb)   BMI 19.67 kg/m²     Physical Exam    Physical Exam:  General:  Pleasant and cooperative. No apparent distress.  HEENT:  Normocephalic, atraumatic, mucus membranes moist.   Neck:  Trachea midline.  No JVD.    Chest:  Clear to auscultation bilaterally. No wheezes, rales, or rhonchi.  CV:  Regular rate and rhythm.  Positive S1/S2.   Abdomen: Bowel sounds present in all four quadrants, abdomen is soft, non-tender, non-distended.  Extremities:  No lower extremity edema or cyanosis.   Neurological:  AAOx3. No focal deficits.  Skin:  Warm and dry. Great toenails with changes consistent with onychomycosis.    Assessment/Plan   #Onychomycosis  - Given topical antifungal    #constipation  #urinary urge incontinence  - continue miralax, okay to use twice daily  - Encouraged scheduled voiding, patient prefers to avoid medication at this time     #Anxiety/Depression  - continue fluoxetine 40mg daily  - Continue Wellbutrin 75mg BID  - lost follow up to support group for people who lost spouses, patient  passed away 10/2021, " feels well supported with family members and dog  - Using Alprazolam sparingly ~1x/month. Does not need refills at this time     #Paroxysmal Afib   #HLD  #HTN  - CACS for risk stratification, has not been completed  - Not following regularly with Cardiology  - She declines anticoagulation for paroxysmal afib, educated on risks     #RA   #osteoporosis   #mild anemia, suspected 2/2 RA  Maintained on Xeljanz, Methotrexate, Folic Acid  DEXA, declines bisphosphonate therapy. Continue calcium-vitD  - Follows with Dr. Flores     #Health maintenance:   Vaccines: UTD - COVID, Flu, Tdap, Pneumonia, Shingles and RSV   Screening: Colonoscopy done 2022, 10 year f/u advised. Mammogram (10/23). Follows with ophthalmology.   Labs: Reviewed recent labwork, none needed today     RTC 6 months, sooner PRN    Jerrell Hanks, DO

## 2024-03-06 ENCOUNTER — APPOINTMENT (OUTPATIENT)
Dept: RHEUMATOLOGY | Facility: CLINIC | Age: 69
End: 2024-03-06
Payer: MEDICARE

## 2024-03-07 ENCOUNTER — TELEPHONE (OUTPATIENT)
Dept: PRIMARY CARE | Facility: CLINIC | Age: 69
End: 2024-03-07

## 2024-03-07 DIAGNOSIS — B35.1 ONYCHOMYCOSIS: Primary | ICD-10-CM

## 2024-03-07 RX ORDER — CICLOPIROX 80 MG/ML
SOLUTION TOPICAL NIGHTLY
Qty: 6.6 ML | Refills: 1 | Status: SHIPPED | OUTPATIENT
Start: 2024-03-07

## 2024-03-18 DIAGNOSIS — K21.9 GASTROESOPHAGEAL REFLUX DISEASE WITHOUT ESOPHAGITIS: ICD-10-CM

## 2024-03-18 DIAGNOSIS — I10 HYPERTENSION, UNSPECIFIED TYPE: ICD-10-CM

## 2024-03-18 RX ORDER — OMEPRAZOLE 40 MG/1
40 CAPSULE, DELAYED RELEASE ORAL
Qty: 100 CAPSULE | Refills: 2 | Status: SHIPPED | OUTPATIENT
Start: 2024-03-18

## 2024-03-18 RX ORDER — TRIAMTERENE/HYDROCHLOROTHIAZID 37.5-25 MG
0.5 TABLET ORAL DAILY
Qty: 50 TABLET | Refills: 2 | Status: SHIPPED | OUTPATIENT
Start: 2024-03-18

## 2024-03-18 RX ORDER — DILTIAZEM HYDROCHLORIDE 180 MG/1
180 CAPSULE, EXTENDED RELEASE ORAL EVERY MORNING
Qty: 100 CAPSULE | Refills: 2 | Status: SHIPPED | OUTPATIENT
Start: 2024-03-18

## 2024-03-28 ENCOUNTER — HOSPITAL ENCOUNTER (OUTPATIENT)
Dept: RADIOLOGY | Facility: CLINIC | Age: 69
Discharge: HOME | End: 2024-03-28
Payer: MEDICARE

## 2024-03-28 DIAGNOSIS — E78.2 MIXED HYPERLIPIDEMIA: ICD-10-CM

## 2024-03-28 PROCEDURE — 75571 CT HRT W/O DYE W/CA TEST: CPT

## 2024-04-01 DIAGNOSIS — R91.1 LUNG NODULE: Primary | ICD-10-CM

## 2024-04-01 DIAGNOSIS — Z87.891 PERSONAL HISTORY OF NICOTINE DEPENDENCE: ICD-10-CM

## 2024-04-18 ENCOUNTER — OFFICE VISIT (OUTPATIENT)
Dept: RHEUMATOLOGY | Facility: CLINIC | Age: 69
End: 2024-04-18
Payer: MEDICARE

## 2024-04-18 ENCOUNTER — LAB (OUTPATIENT)
Dept: LAB | Facility: LAB | Age: 69
End: 2024-04-18
Payer: MEDICARE

## 2024-04-18 VITALS
DIASTOLIC BLOOD PRESSURE: 67 MMHG | HEART RATE: 63 BPM | SYSTOLIC BLOOD PRESSURE: 128 MMHG | OXYGEN SATURATION: 99 % | HEIGHT: 71 IN | BODY MASS INDEX: 19.73 KG/M2 | TEMPERATURE: 97.3 F | WEIGHT: 140.9 LBS

## 2024-04-18 DIAGNOSIS — E46 PROTEIN-CALORIE MALNUTRITION, UNSPECIFIED SEVERITY (MULTI): ICD-10-CM

## 2024-04-18 DIAGNOSIS — Z79.631 ON METHOTREXATE THERAPY: ICD-10-CM

## 2024-04-18 DIAGNOSIS — Z79.622 LONG-TERM CURRENT USE OF TOFACITINIB: ICD-10-CM

## 2024-04-18 DIAGNOSIS — M05.79 RHEUMATOID ARTHRITIS OF MULTIPLE SITES WITHOUT ORGAN OR SYSTEM INVOLVEMENT WITH POSITIVE RHEUMATOID FACTOR (MULTI): ICD-10-CM

## 2024-04-18 DIAGNOSIS — M05.79 RHEUMATOID ARTHRITIS OF MULTIPLE SITES WITHOUT ORGAN OR SYSTEM INVOLVEMENT WITH POSITIVE RHEUMATOID FACTOR (MULTI): Primary | ICD-10-CM

## 2024-04-18 DIAGNOSIS — I48.0 PAROXYSMAL A-FIB (MULTI): ICD-10-CM

## 2024-04-18 LAB
ALBUMIN SERPL BCP-MCNC: 4.3 G/DL (ref 3.4–5)
ALP SERPL-CCNC: 69 U/L (ref 33–136)
ALT SERPL W P-5'-P-CCNC: 12 U/L (ref 7–45)
ANION GAP SERPL CALC-SCNC: 12 MMOL/L (ref 10–20)
AST SERPL W P-5'-P-CCNC: 21 U/L (ref 9–39)
BASOPHILS # BLD AUTO: 0.04 X10*3/UL (ref 0–0.1)
BASOPHILS NFR BLD AUTO: 1.1 %
BILIRUB SERPL-MCNC: 0.3 MG/DL (ref 0–1.2)
BUN SERPL-MCNC: 14 MG/DL (ref 6–23)
CALCIUM SERPL-MCNC: 9.7 MG/DL (ref 8.6–10.6)
CHLORIDE SERPL-SCNC: 104 MMOL/L (ref 98–107)
CO2 SERPL-SCNC: 29 MMOL/L (ref 21–32)
CREAT SERPL-MCNC: 0.9 MG/DL (ref 0.5–1.05)
CRP SERPL-MCNC: <0.1 MG/DL
EGFRCR SERPLBLD CKD-EPI 2021: 70 ML/MIN/1.73M*2
EOSINOPHIL # BLD AUTO: 0.09 X10*3/UL (ref 0–0.7)
EOSINOPHIL NFR BLD AUTO: 2.4 %
ERYTHROCYTE [DISTWIDTH] IN BLOOD BY AUTOMATED COUNT: 17.1 % (ref 11.5–14.5)
ERYTHROCYTE [SEDIMENTATION RATE] IN BLOOD BY WESTERGREN METHOD: 7 MM/H (ref 0–30)
GLUCOSE SERPL-MCNC: 80 MG/DL (ref 74–99)
HCT VFR BLD AUTO: 32.5 % (ref 36–46)
HGB BLD-MCNC: 10.5 G/DL (ref 12–16)
IMM GRANULOCYTES # BLD AUTO: 0.01 X10*3/UL (ref 0–0.7)
IMM GRANULOCYTES NFR BLD AUTO: 0.3 % (ref 0–0.9)
LYMPHOCYTES # BLD AUTO: 0.57 X10*3/UL (ref 1.2–4.8)
LYMPHOCYTES NFR BLD AUTO: 15.5 %
MCH RBC QN AUTO: 31.1 PG (ref 26–34)
MCHC RBC AUTO-ENTMCNC: 32.3 G/DL (ref 32–36)
MCV RBC AUTO: 96 FL (ref 80–100)
MONOCYTES # BLD AUTO: 0.55 X10*3/UL (ref 0.1–1)
MONOCYTES NFR BLD AUTO: 14.9 %
NEUTROPHILS # BLD AUTO: 2.42 X10*3/UL (ref 1.2–7.7)
NEUTROPHILS NFR BLD AUTO: 65.8 %
NRBC BLD-RTO: 0 /100 WBCS (ref 0–0)
PLATELET # BLD AUTO: 325 X10*3/UL (ref 150–450)
POTASSIUM SERPL-SCNC: 4.2 MMOL/L (ref 3.5–5.3)
PROT SERPL-MCNC: 7.1 G/DL (ref 6.4–8.2)
RBC # BLD AUTO: 3.38 X10*6/UL (ref 4–5.2)
SODIUM SERPL-SCNC: 141 MMOL/L (ref 136–145)
WBC # BLD AUTO: 3.7 X10*3/UL (ref 4.4–11.3)

## 2024-04-18 PROCEDURE — 85652 RBC SED RATE AUTOMATED: CPT

## 2024-04-18 PROCEDURE — 99214 OFFICE O/P EST MOD 30 MIN: CPT | Performed by: INTERNAL MEDICINE

## 2024-04-18 PROCEDURE — 1159F MED LIST DOCD IN RCRD: CPT | Performed by: INTERNAL MEDICINE

## 2024-04-18 PROCEDURE — 1036F TOBACCO NON-USER: CPT | Performed by: INTERNAL MEDICINE

## 2024-04-18 PROCEDURE — 36415 COLL VENOUS BLD VENIPUNCTURE: CPT

## 2024-04-18 PROCEDURE — 86140 C-REACTIVE PROTEIN: CPT

## 2024-04-18 PROCEDURE — 3074F SYST BP LT 130 MM HG: CPT | Performed by: INTERNAL MEDICINE

## 2024-04-18 PROCEDURE — 80053 COMPREHEN METABOLIC PANEL: CPT

## 2024-04-18 PROCEDURE — 3078F DIAST BP <80 MM HG: CPT | Performed by: INTERNAL MEDICINE

## 2024-04-18 PROCEDURE — 85025 COMPLETE CBC W/AUTO DIFF WBC: CPT

## 2024-04-18 PROCEDURE — 1160F RVW MEDS BY RX/DR IN RCRD: CPT | Performed by: INTERNAL MEDICINE

## 2024-04-18 ASSESSMENT — ENCOUNTER SYMPTOMS
FEVER: 0
FATIGUE: 0
BACK PAIN: 0
COLOR CHANGE: 0
COUGH: 0
NUMBNESS: 0
WEAKNESS: 0
SHORTNESS OF BREATH: 0
ARTHRALGIAS: 1
MYALGIAS: 0
JOINT SWELLING: 0

## 2024-04-18 ASSESSMENT — PATIENT HEALTH QUESTIONNAIRE - PHQ9
1. LITTLE INTEREST OR PLEASURE IN DOING THINGS: NOT AT ALL
SUM OF ALL RESPONSES TO PHQ9 QUESTIONS 1 & 2: 0
2. FEELING DOWN, DEPRESSED OR HOPELESS: NOT AT ALL

## 2024-04-18 NOTE — ASSESSMENT & PLAN NOTE
On methotrexate and xeljanz.   Pt meets remission criteria.  Pt to continue meds.  Labs ordered today to monitor for increased disease activity, end organ manifestations and to monitor for any drug toxicities due to chronic medications

## 2024-04-18 NOTE — PATIENT INSTRUCTIONS
"It was a pleasure to see you today  Please call if your symptoms worsen  Please review your summary for education and reminders.  Follow up at your next appointment.    If you had labs/xrays done today, you will be able to view on [employer].   We will contact you when the results are reviewed for further discussion.  Please note that you may receive your results before I have had a chance to review.  Please know I will be contacting you for discussion  Homegoing instructions for all patient  A healthy lifestyle helps chronic diseases  These are all the goals you should strive to improve your overall health   Blood pressure <130/85   BMI of <30 or waist circumference that is 1/2 of your height   Fasting blood sugar <107 (if you are diabetic, aim for an A1c <6.4%_   LDL cholesterol <130   Avoid smoking   Manage your stress   Get your preventive exams   Get your immunizations   What else for RA?    Any type of exercise is better than nothing.  Whether you do cardio, walking, lift weights or yoga, all can help in improving physical function.  Occupational and physical therapy can improve physical function and decrease pain by providing tools and techniques to improve your day.  We can do a referral if you haven't seen one yet  Braces and splints for affected joints can also help  If your gait is affected, strongly recommend assistive devices like canes or walkers.  We don't want to risk injuries from falls.  Can Diet help?   Consider the Mediterranean diet  There are some foods that are considered \"inflammatory\"  Look up anti-inflammatory diets for a list of foods to avoid.  No dietary supplements help unfortunately.  Work on getting to a normal weight/BMI.  This will lessen the stress on your joints.  What else?   Acupuncture   Massage therapy   Apply heat to affected joints  We do not recommend chiropractic care as it has not been shown to help in RA.  If you smoke, stop     (From 2022ACR guidelines for exercise, rehab " and diet in RA)   You are on methotrexate.  Methotrexate can affect your liver.  Please refrain from drinking alcohol excessively and please update me with any new medications to review for any potential interactions  Folic acid supplements can minimize side effects of oral ulcers--make sure you take it daily

## 2024-04-18 NOTE — PROGRESS NOTES
RHEUMATOLOGY PROGRESS NOTE  Eh LYLES Uzl 69 y.o. female  Chief Complaint   Patient presents with    Follow-up    Rheumatoid Arthritis       SUBJECTIVE  Re:  RA  Pt continues to do well.  No recurrence of nodules  No pain.   Is aching as she is doing more work around the house but nothing severe.  No swelling      Patient Active Problem List    Diagnosis Date Noted    Tinnitus 10/12/2023    Protein-calorie malnutrition, unspecified severity (Multi) 10/12/2023    3-vessel CAD 09/08/2023    Anemia 09/08/2023    Anxiety 09/08/2023    Bilateral sensorineural hearing loss 09/08/2023    BPPV (benign paroxysmal positional vertigo) 09/08/2023    Cervicalgia 09/08/2023    Depression 09/08/2023    Dysfunction of right eustachian tube 09/08/2023    GERD (gastroesophageal reflux disease) 09/08/2023    Hyperlipidemia, mixed 09/08/2023    Hypertension, benign 09/08/2023    Irritable bowel syndrome with predominant constipation 09/08/2023    Long-term current use of tofacitinib 09/08/2023    Migraine without aura and without status migrainosus, not intractable 09/08/2023    On methotrexate therapy 09/08/2023    Osteoporosis 09/08/2023    Paroxysmal A-fib (Multi) 09/08/2023    Primary osteoarthritis of first carpometacarpal joint of left hand 09/08/2023    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 (Multi) 09/08/2023    TIA (transient ischemic attack) 09/08/2023     Past Medical History:   Diagnosis Date    Consumes 2 to 3 servings of caffeine per day     GERD (gastroesophageal reflux disease)     Herpes zoster     Rheumatoid nodule, unspecified site (Multi) 10/07/2022    Rheumatoid nodules     Current Outpatient Medications   Medication Instructions    ALPRAZolam (XANAX) 0.5 mg, oral, Nightly PRN    amitriptyline (ELAVIL) 20 mg, oral, Nightly    aspirin 325 mg, oral, Daily    Bacillus subtilis-inulin 1.5 billion cell-1 gram tablet,chewable TAKE PER DIRECTED     "buPROPion (WELLBUTRIN) 75 mg, oral, 2 times daily    calcium carbonate-vit D3-min 600 mg calcium- 400 unit tablet oral, TAKE PER DIRECTED    ciclopirox (Penlac) 8 % solution Topical, Nightly, For 6 months as instructed.    dilTIAZem ER (TIAZAC) 180 mg, oral, Every morning    efinaconazole 10 % solution with applicator Use topically daily for 6 months as instructed    FLUoxetine (PROZAC) 40 mg, oral, Daily    L. acidophilus/Bifid. animalis 15.5 billion cell capsule 1 capsule, oral, Daily    methotrexate (Trexall) 2.5 mg tablet TAKE 4 TABLETS BY MOUTH WEEKLY    metroNIDAZOLE (Metrocream) 0.75 % cream Topical, 2 times daily, to face    multivitamin (Daily Multi-Vitamin) tablet 1 tablet, oral, Daily    NON FORMULARY 1 each, Topical, Daily, Zublia Sol Baus    omeprazole (PRILOSEC) 40 mg, oral, Daily before breakfast    polyethylene glycol 3350 (MIRALAX ORAL) oral, ONCE DAILY PER DIRECTED    triamcinolone (Kenalog) 0.1 % cream Topical, 2 times daily, Apply to affected area 1-2 times daily as needed.    triamterene-hydrochlorothiazid (Maxzide-25) 37.5-25 mg tablet 0.5 tablets, oral, Daily    Xeljanz XR 11 mg, oral, Daily, Do not crush, chew or split. Swallow whole.     Allergies   Allergen Reactions    Amoxicillin Rash     Review of Systems   Constitutional:  Negative for fatigue and fever.   Respiratory:  Negative for cough and shortness of breath.    Cardiovascular:  Negative for leg swelling.   Musculoskeletal:  Positive for arthralgias. Negative for back pain, gait problem, joint swelling and myalgias.   Skin:  Negative for color change and rash.   Neurological:  Negative for weakness and numbness.       PHYSICAL EXAM  /67   Pulse 63   Temp 36.3 °C (97.3 °F) (Temporal)   Ht 1.803 m (5' 11\")   Wt 63.9 kg (140 lb 14.4 oz)   SpO2 99%   BMI 19.65 kg/m²   Physical Exam  Vitals reviewed.   Constitutional:       General: She is not in acute distress.     Appearance: Normal appearance.   HENT:      Head: " Normocephalic and atraumatic.   Eyes:      General: No scleral icterus.     Extraocular Movements: Extraocular movements intact.      Conjunctiva/sclera: Conjunctivae normal.      Pupils: Pupils are equal, round, and reactive to light.   Pulmonary:      Effort: Pulmonary effort is normal. No respiratory distress.   Musculoskeletal:         General: No swelling, tenderness or deformity.      Cervical back: Normal range of motion and neck supple. No tenderness.      Right lower leg: No edema.      Left lower leg: No edema.      Comments: No synovitis of examined joints   Skin:     Coloration: Skin is not pale.      Findings: No erythema or rash.   Neurological:      General: No focal deficit present.      Mental Status: She is alert and oriented to person, place, and time. Mental status is at baseline.      Gait: Gait normal.   Psychiatric:         Mood and Affect: Mood normal.         Assessment/plan  Problem List Items Addressed This Visit       Long-term current use of tofacitinib    On methotrexate therapy    Paroxysmal A-fib (Multi)    Current Assessment & Plan     No symptoms.  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   Pt meets remission criteria.  Pt to continue meds.  Labs ordered today to monitor for increased disease activity, end organ manifestations and to monitor for any drug toxicities due to chronic medications           Relevant Orders    CBC and Auto Differential    Comprehensive Metabolic Panel    C-Reactive Protein    Sedimentation Rate    Protein-calorie malnutrition, unspecified severity (Multi)    Current Assessment & Plan     Weight stable.  Continue to monitor          Follow up: __4___months

## 2024-05-07 DIAGNOSIS — F51.01 PRIMARY INSOMNIA: ICD-10-CM

## 2024-05-14 RX ORDER — AMITRIPTYLINE HYDROCHLORIDE 10 MG/1
20 TABLET, FILM COATED ORAL NIGHTLY
Qty: 180 TABLET | Refills: 0 | Status: SHIPPED | OUTPATIENT
Start: 2024-05-14

## 2024-05-20 ENCOUNTER — APPOINTMENT (OUTPATIENT)
Dept: RADIOLOGY | Facility: CLINIC | Age: 69
End: 2024-05-20
Payer: MEDICARE

## 2024-05-23 ENCOUNTER — OFFICE VISIT (OUTPATIENT)
Dept: PRIMARY CARE | Facility: CLINIC | Age: 69
End: 2024-05-23
Payer: MEDICARE

## 2024-05-23 ENCOUNTER — LAB (OUTPATIENT)
Dept: LAB | Facility: LAB | Age: 69
End: 2024-05-23
Payer: MEDICARE

## 2024-05-23 ENCOUNTER — APPOINTMENT (OUTPATIENT)
Dept: PRIMARY CARE | Facility: CLINIC | Age: 69
End: 2024-05-23
Payer: MEDICARE

## 2024-05-23 VITALS
BODY MASS INDEX: 19.74 KG/M2 | HEIGHT: 71 IN | SYSTOLIC BLOOD PRESSURE: 118 MMHG | HEART RATE: 76 BPM | WEIGHT: 141 LBS | DIASTOLIC BLOOD PRESSURE: 72 MMHG | OXYGEN SATURATION: 97 %

## 2024-05-23 DIAGNOSIS — R41.3 MEMORY CHANGES: ICD-10-CM

## 2024-05-23 DIAGNOSIS — R00.2 PALPITATIONS: Primary | ICD-10-CM

## 2024-05-23 DIAGNOSIS — E78.5 DYSLIPIDEMIA: ICD-10-CM

## 2024-05-23 DIAGNOSIS — R00.2 PALPITATIONS: ICD-10-CM

## 2024-05-23 DIAGNOSIS — E55.9 VITAMIN D DEFICIENCY: ICD-10-CM

## 2024-05-23 DIAGNOSIS — Z79.899 LONG TERM USE OF DRUG: ICD-10-CM

## 2024-05-23 DIAGNOSIS — F41.9 ANXIETY: ICD-10-CM

## 2024-05-23 LAB
25(OH)D3 SERPL-MCNC: 51 NG/ML (ref 30–100)
ALBUMIN SERPL BCP-MCNC: 4.2 G/DL (ref 3.4–5)
ALP SERPL-CCNC: 64 U/L (ref 33–136)
ALT SERPL W P-5'-P-CCNC: 12 U/L (ref 7–45)
AMPHETAMINES UR QL SCN: NORMAL
ANION GAP SERPL CALC-SCNC: 13 MMOL/L (ref 10–20)
AST SERPL W P-5'-P-CCNC: 25 U/L (ref 9–39)
BARBITURATES UR QL SCN: NORMAL
BILIRUB SERPL-MCNC: 0.2 MG/DL (ref 0–1.2)
BUN SERPL-MCNC: 17 MG/DL (ref 6–23)
BZE UR QL SCN: NORMAL
CALCIUM SERPL-MCNC: 9.5 MG/DL (ref 8.6–10.6)
CANNABINOIDS UR QL SCN: NORMAL
CHLORIDE SERPL-SCNC: 102 MMOL/L (ref 98–107)
CHOLEST SERPL-MCNC: 211 MG/DL (ref 0–199)
CHOLESTEROL/HDL RATIO: 2.3
CO2 SERPL-SCNC: 31 MMOL/L (ref 21–32)
CREAT SERPL-MCNC: 0.84 MG/DL (ref 0.5–1.05)
CREAT UR-MCNC: 47.6 MG/DL (ref 20–320)
EGFRCR SERPLBLD CKD-EPI 2021: 76 ML/MIN/1.73M*2
ERYTHROCYTE [DISTWIDTH] IN BLOOD BY AUTOMATED COUNT: 18.3 % (ref 11.5–14.5)
GLUCOSE SERPL-MCNC: 73 MG/DL (ref 74–99)
HCT VFR BLD AUTO: 32.1 % (ref 36–46)
HDLC SERPL-MCNC: 92.8 MG/DL
HGB BLD-MCNC: 10.2 G/DL (ref 12–16)
LDLC SERPL CALC-MCNC: 111 MG/DL
MCH RBC QN AUTO: 31.3 PG (ref 26–34)
MCHC RBC AUTO-ENTMCNC: 31.8 G/DL (ref 32–36)
MCV RBC AUTO: 99 FL (ref 80–100)
NON HDL CHOLESTEROL: 118 MG/DL (ref 0–149)
NRBC BLD-RTO: 0 /100 WBCS (ref 0–0)
PCP UR QL SCN: NORMAL
PLATELET # BLD AUTO: 334 X10*3/UL (ref 150–450)
POTASSIUM SERPL-SCNC: 4.8 MMOL/L (ref 3.5–5.3)
PROT SERPL-MCNC: 7 G/DL (ref 6.4–8.2)
RBC # BLD AUTO: 3.26 X10*6/UL (ref 4–5.2)
SODIUM SERPL-SCNC: 141 MMOL/L (ref 136–145)
TRIGL SERPL-MCNC: 38 MG/DL (ref 0–149)
TSH SERPL-ACNC: 2.2 MIU/L (ref 0.44–3.98)
VIT B12 SERPL-MCNC: 350 PG/ML (ref 211–911)
VLDL: 8 MG/DL (ref 0–40)
WBC # BLD AUTO: 4.7 X10*3/UL (ref 4.4–11.3)

## 2024-05-23 PROCEDURE — 80358 DRUG SCREENING METHADONE: CPT

## 2024-05-23 PROCEDURE — 80365 DRUG SCREENING OXYCODONE: CPT

## 2024-05-23 PROCEDURE — 82306 VITAMIN D 25 HYDROXY: CPT

## 2024-05-23 PROCEDURE — 80368 SEDATIVE HYPNOTICS: CPT

## 2024-05-23 PROCEDURE — 80307 DRUG TEST PRSMV CHEM ANLYZR: CPT

## 2024-05-23 PROCEDURE — 80373 DRUG SCREENING TRAMADOL: CPT

## 2024-05-23 PROCEDURE — 3078F DIAST BP <80 MM HG: CPT | Performed by: STUDENT IN AN ORGANIZED HEALTH CARE EDUCATION/TRAINING PROGRAM

## 2024-05-23 PROCEDURE — 80053 COMPREHEN METABOLIC PANEL: CPT

## 2024-05-23 PROCEDURE — 1160F RVW MEDS BY RX/DR IN RCRD: CPT | Performed by: STUDENT IN AN ORGANIZED HEALTH CARE EDUCATION/TRAINING PROGRAM

## 2024-05-23 PROCEDURE — 82607 VITAMIN B-12: CPT

## 2024-05-23 PROCEDURE — 1036F TOBACCO NON-USER: CPT | Performed by: STUDENT IN AN ORGANIZED HEALTH CARE EDUCATION/TRAINING PROGRAM

## 2024-05-23 PROCEDURE — 82570 ASSAY OF URINE CREATININE: CPT

## 2024-05-23 PROCEDURE — 85027 COMPLETE CBC AUTOMATED: CPT

## 2024-05-23 PROCEDURE — 80061 LIPID PANEL: CPT

## 2024-05-23 PROCEDURE — 80361 OPIATES 1 OR MORE: CPT

## 2024-05-23 PROCEDURE — 36415 COLL VENOUS BLD VENIPUNCTURE: CPT

## 2024-05-23 PROCEDURE — 80346 BENZODIAZEPINES1-12: CPT

## 2024-05-23 PROCEDURE — 1159F MED LIST DOCD IN RCRD: CPT | Performed by: STUDENT IN AN ORGANIZED HEALTH CARE EDUCATION/TRAINING PROGRAM

## 2024-05-23 PROCEDURE — 99214 OFFICE O/P EST MOD 30 MIN: CPT | Performed by: STUDENT IN AN ORGANIZED HEALTH CARE EDUCATION/TRAINING PROGRAM

## 2024-05-23 PROCEDURE — 84443 ASSAY THYROID STIM HORMONE: CPT

## 2024-05-23 PROCEDURE — 80354 DRUG SCREENING FENTANYL: CPT

## 2024-05-23 PROCEDURE — 3074F SYST BP LT 130 MM HG: CPT | Performed by: STUDENT IN AN ORGANIZED HEALTH CARE EDUCATION/TRAINING PROGRAM

## 2024-05-23 RX ORDER — ROSUVASTATIN CALCIUM 10 MG/1
10 TABLET, COATED ORAL DAILY
Qty: 90 TABLET | Refills: 3 | Status: SHIPPED | OUTPATIENT
Start: 2024-05-23 | End: 2025-05-23

## 2024-05-23 RX ORDER — ALPRAZOLAM 0.5 MG/1
0.5 TABLET ORAL NIGHTLY PRN
Qty: 12 TABLET | Refills: 0 | Status: SHIPPED | OUTPATIENT
Start: 2024-05-23

## 2024-05-23 ASSESSMENT — PATIENT HEALTH QUESTIONNAIRE - PHQ9
1. LITTLE INTEREST OR PLEASURE IN DOING THINGS: SEVERAL DAYS
10. IF YOU CHECKED OFF ANY PROBLEMS, HOW DIFFICULT HAVE THESE PROBLEMS MADE IT FOR YOU TO DO YOUR WORK, TAKE CARE OF THINGS AT HOME, OR GET ALONG WITH OTHER PEOPLE: NOT DIFFICULT AT ALL
SUM OF ALL RESPONSES TO PHQ9 QUESTIONS 1 AND 2: 2
2. FEELING DOWN, DEPRESSED OR HOPELESS: SEVERAL DAYS

## 2024-05-23 NOTE — PROGRESS NOTES
"Subjective   Patient ID: Eh Lee is a 69 y.o. female who presents for Follow-up and Med Refill.  HPI   Patient is here for routine follow up.    She reports that she has been getting more short of breath while walking the dog occasionally, will feel some palpitations at times when this occurs. No associated CP, diaphoresis, chest pressure or any other symptoms. Resolves when she rests. Has a hx of PAF, she states that during that episodes her palpitations were significant, HR was found to be ~180 on ED evaluation. She will occasionally get lightheadedness when she stands up at times. She has been drinking \"3-4 cups of water daily\", notices that when she drinks more water she does not notice the lightheadedness. She has been eating regular meals throughout the day.     She is concerned about some possible memory impairment issues. Mother had dementia, she would like to have neurocognitive testing done to evaluate for early dementia.    Review of Systems  10 point ros negative aside form HPI    Objective   /72   Pulse 76   Ht 1.803 m (5' 11\")   Wt 64 kg (141 lb)   SpO2 97%   BMI 19.67 kg/m²     Physical Exam    Physical Exam:  General:  Pleasant and cooperative. No apparent distress.  HEENT:  Normocephalic, atraumatic, mucus membranes moist.   Neck:  Trachea midline.  No JVD.    Chest:  Clear to auscultation bilaterally. No wheezes, rales, or rhonchi.  CV:  Regular rate and rhythm.  Positive S1/S2.   Abdomen: Bowel sounds present in all four quadrants, abdomen is soft, non-tender, non-distended.  Extremities:  No lower extremity edema or cyanosis.   Neurological:  AAOx3. No focal deficits.  Skin:  Warm and dry. Great toenails with changes consistent with onychomycosis.    Assessment/Plan   #Onychomycosis  - Continue topical antifungal    #Anxiety/Depression  - continue fluoxetine 40mg daily  - Continue Wellbutrin 75mg BID  - lost follow up to support group for people who lost spouses, patient  " "passed away 10/2021, feels well supported with family members and dog  - Using Alprazolam sparingly ~1-2x/month.  - UDS and CSA today  - reviewed OARRS, no concerning findings    #palpitations, SOB  #Paroxysmal Afib   Hx of episode of PAF \"years ago\"  - cardiac event monitor ordered for further investigation. She remains in regular rhythm during office visit today    #HLD  #HTN  - CACS for risk stratification, demonstrates some plaque buildup in LAD an Lcx  - Start rosuvastatin 10mg daily, SE profile discussed     #RA   #osteoporosis   #mild anemia, suspected 2/2 RA  Maintained on Xeljanz, Methotrexate, Folic Acid  DEXA, declines bisphosphonate therapy. Continue calcium-vitD  - Follows with Dr. Flores     #former smoker  Quit >20 years ago    #concern for memory impairment  - referral to follow up with neurology for cognitive testing    #Health maintenance:   Vaccines: UTD - COVID, Flu, Tdap, Pneumonia, Shingles and RSV   Screening: Colonoscopy done 2022, 10 year f/u advised. Mammogram (10/23). Follows with ophthalmology.   Labs: Obtain today     RTC 6 months, sooner PRN    Jerrell Hanks, DO     "

## 2024-06-06 ENCOUNTER — HOSPITAL ENCOUNTER (OUTPATIENT)
Dept: CARDIOLOGY | Facility: CLINIC | Age: 69
Discharge: HOME | End: 2024-06-06
Payer: MEDICARE

## 2024-06-06 DIAGNOSIS — R00.2 PALPITATIONS: ICD-10-CM

## 2024-06-06 PROCEDURE — 93247 EXT ECG>7D<15D SCAN A/R: CPT

## 2024-06-13 DIAGNOSIS — F41.9 ANXIETY: ICD-10-CM

## 2024-06-13 RX ORDER — FLUOXETINE HYDROCHLORIDE 40 MG/1
40 CAPSULE ORAL DAILY
Qty: 90 CAPSULE | Refills: 3 | Status: SHIPPED | OUTPATIENT
Start: 2024-06-13

## 2024-07-12 ENCOUNTER — TELEPHONE (OUTPATIENT)
Dept: INTERNAL MEDICINE | Facility: HOSPITAL | Age: 69
End: 2024-07-12
Payer: MEDICARE

## 2024-07-12 DIAGNOSIS — I10 HYPERTENSION, UNSPECIFIED TYPE: ICD-10-CM

## 2024-07-12 RX ORDER — DILTIAZEM HYDROCHLORIDE 240 MG/1
240 CAPSULE, EXTENDED RELEASE ORAL EVERY MORNING
Qty: 90 CAPSULE | Refills: 1 | Status: SHIPPED | OUTPATIENT
Start: 2024-07-12 | End: 2025-01-08

## 2024-07-12 NOTE — TELEPHONE ENCOUNTER
Result Communication    Resulted Orders   Holter Or Event Cardiac Monitor    Narrative    Images from the original result were not included.  REFER TO SCANNED IMAGE         1:32 PM    Left message for patient to call office to discuss recent cardiac event monitor results.

## 2024-07-12 NOTE — PROGRESS NOTES
Called to discuss cardiac monitor results. Frequent, symptomatic PVCs. Increase Diltiazem to 240mg daily. Sent to pharmacy, patient agreeable with plan.

## 2024-07-18 DIAGNOSIS — B35.1 ONYCHOMYCOSIS: ICD-10-CM

## 2024-07-18 RX ORDER — CICLOPIROX 80 MG/ML
SOLUTION TOPICAL NIGHTLY
Qty: 6.6 ML | Refills: 0 | Status: SHIPPED | OUTPATIENT
Start: 2024-07-18

## 2024-08-19 ENCOUNTER — APPOINTMENT (OUTPATIENT)
Dept: RADIOLOGY | Facility: CLINIC | Age: 69
End: 2024-08-19
Payer: MEDICARE

## 2024-08-22 ENCOUNTER — APPOINTMENT (OUTPATIENT)
Dept: PRIMARY CARE | Facility: CLINIC | Age: 69
End: 2024-08-22
Payer: MEDICARE

## 2024-08-28 ENCOUNTER — LAB (OUTPATIENT)
Dept: LAB | Facility: LAB | Age: 69
End: 2024-08-28
Payer: MEDICARE

## 2024-08-28 ENCOUNTER — APPOINTMENT (OUTPATIENT)
Dept: RHEUMATOLOGY | Facility: CLINIC | Age: 69
End: 2024-08-28
Payer: MEDICARE

## 2024-08-28 VITALS
BODY MASS INDEX: 19.54 KG/M2 | WEIGHT: 139.6 LBS | TEMPERATURE: 97.2 F | SYSTOLIC BLOOD PRESSURE: 118 MMHG | DIASTOLIC BLOOD PRESSURE: 90 MMHG | HEART RATE: 73 BPM | HEIGHT: 71 IN | OXYGEN SATURATION: 100 %

## 2024-08-28 DIAGNOSIS — M05.79 RHEUMATOID ARTHRITIS OF MULTIPLE SITES WITHOUT ORGAN OR SYSTEM INVOLVEMENT WITH POSITIVE RHEUMATOID FACTOR (MULTI): ICD-10-CM

## 2024-08-28 DIAGNOSIS — Z79.622 LONG-TERM CURRENT USE OF TOFACITINIB: ICD-10-CM

## 2024-08-28 DIAGNOSIS — Z23 NEED FOR VACCINATION: ICD-10-CM

## 2024-08-28 DIAGNOSIS — M05.79 RHEUMATOID ARTHRITIS OF MULTIPLE SITES WITHOUT ORGAN OR SYSTEM INVOLVEMENT WITH POSITIVE RHEUMATOID FACTOR (MULTI): Primary | ICD-10-CM

## 2024-08-28 DIAGNOSIS — Z79.631 ON METHOTREXATE THERAPY: ICD-10-CM

## 2024-08-28 LAB
ALBUMIN SERPL BCP-MCNC: 4.7 G/DL (ref 3.4–5)
ALP SERPL-CCNC: 76 U/L (ref 33–136)
ALT SERPL W P-5'-P-CCNC: 12 U/L (ref 7–45)
ANION GAP SERPL CALC-SCNC: 14 MMOL/L (ref 10–20)
AST SERPL W P-5'-P-CCNC: 23 U/L (ref 9–39)
BASOPHILS # BLD AUTO: 0.04 X10*3/UL (ref 0–0.1)
BASOPHILS NFR BLD AUTO: 0.9 %
BILIRUB SERPL-MCNC: 0.3 MG/DL (ref 0–1.2)
BUN SERPL-MCNC: 12 MG/DL (ref 6–23)
CALCIUM SERPL-MCNC: 9.9 MG/DL (ref 8.6–10.6)
CHLORIDE SERPL-SCNC: 102 MMOL/L (ref 98–107)
CO2 SERPL-SCNC: 28 MMOL/L (ref 21–32)
CREAT SERPL-MCNC: 0.81 MG/DL (ref 0.5–1.05)
CRP SERPL-MCNC: <0.1 MG/DL
EGFRCR SERPLBLD CKD-EPI 2021: 79 ML/MIN/1.73M*2
EOSINOPHIL # BLD AUTO: 0.05 X10*3/UL (ref 0–0.7)
EOSINOPHIL NFR BLD AUTO: 1.1 %
ERYTHROCYTE [DISTWIDTH] IN BLOOD BY AUTOMATED COUNT: 19.2 % (ref 11.5–14.5)
ERYTHROCYTE [SEDIMENTATION RATE] IN BLOOD BY WESTERGREN METHOD: 5 MM/H (ref 0–30)
GLUCOSE SERPL-MCNC: 79 MG/DL (ref 74–99)
HCT VFR BLD AUTO: 33.2 % (ref 36–46)
HGB BLD-MCNC: 10.4 G/DL (ref 12–16)
IMM GRANULOCYTES # BLD AUTO: 0.01 X10*3/UL (ref 0–0.7)
IMM GRANULOCYTES NFR BLD AUTO: 0.2 % (ref 0–0.9)
LYMPHOCYTES # BLD AUTO: 0.76 X10*3/UL (ref 1.2–4.8)
LYMPHOCYTES NFR BLD AUTO: 16.5 %
MCH RBC QN AUTO: 31.1 PG (ref 26–34)
MCHC RBC AUTO-ENTMCNC: 31.3 G/DL (ref 32–36)
MCV RBC AUTO: 99 FL (ref 80–100)
MONOCYTES # BLD AUTO: 0.63 X10*3/UL (ref 0.1–1)
MONOCYTES NFR BLD AUTO: 13.7 %
NEUTROPHILS # BLD AUTO: 3.12 X10*3/UL (ref 1.2–7.7)
NEUTROPHILS NFR BLD AUTO: 67.6 %
NRBC BLD-RTO: 0 /100 WBCS (ref 0–0)
PLATELET # BLD AUTO: 349 X10*3/UL (ref 150–450)
POTASSIUM SERPL-SCNC: 4.7 MMOL/L (ref 3.5–5.3)
PROT SERPL-MCNC: 7.4 G/DL (ref 6.4–8.2)
RBC # BLD AUTO: 3.34 X10*6/UL (ref 4–5.2)
SODIUM SERPL-SCNC: 139 MMOL/L (ref 136–145)
WBC # BLD AUTO: 4.6 X10*3/UL (ref 4.4–11.3)

## 2024-08-28 PROCEDURE — 36415 COLL VENOUS BLD VENIPUNCTURE: CPT

## 2024-08-28 PROCEDURE — 1160F RVW MEDS BY RX/DR IN RCRD: CPT | Performed by: INTERNAL MEDICINE

## 2024-08-28 PROCEDURE — 3080F DIAST BP >= 90 MM HG: CPT | Performed by: INTERNAL MEDICINE

## 2024-08-28 PROCEDURE — 86140 C-REACTIVE PROTEIN: CPT

## 2024-08-28 PROCEDURE — 85652 RBC SED RATE AUTOMATED: CPT

## 2024-08-28 PROCEDURE — G0008 ADMIN INFLUENZA VIRUS VAC: HCPCS | Performed by: INTERNAL MEDICINE

## 2024-08-28 PROCEDURE — 1158F ADVNC CARE PLAN TLK DOCD: CPT | Performed by: INTERNAL MEDICINE

## 2024-08-28 PROCEDURE — 85025 COMPLETE CBC W/AUTO DIFF WBC: CPT

## 2024-08-28 PROCEDURE — 90662 IIV NO PRSV INCREASED AG IM: CPT | Performed by: INTERNAL MEDICINE

## 2024-08-28 PROCEDURE — 80053 COMPREHEN METABOLIC PANEL: CPT

## 2024-08-28 PROCEDURE — 1123F ACP DISCUSS/DSCN MKR DOCD: CPT | Performed by: INTERNAL MEDICINE

## 2024-08-28 PROCEDURE — 3074F SYST BP LT 130 MM HG: CPT | Performed by: INTERNAL MEDICINE

## 2024-08-28 PROCEDURE — 1036F TOBACCO NON-USER: CPT | Performed by: INTERNAL MEDICINE

## 2024-08-28 PROCEDURE — 99214 OFFICE O/P EST MOD 30 MIN: CPT | Performed by: INTERNAL MEDICINE

## 2024-08-28 PROCEDURE — 1159F MED LIST DOCD IN RCRD: CPT | Performed by: INTERNAL MEDICINE

## 2024-08-28 PROCEDURE — 3008F BODY MASS INDEX DOCD: CPT | Performed by: INTERNAL MEDICINE

## 2024-08-28 ASSESSMENT — PATIENT HEALTH QUESTIONNAIRE - PHQ9
10. IF YOU CHECKED OFF ANY PROBLEMS, HOW DIFFICULT HAVE THESE PROBLEMS MADE IT FOR YOU TO DO YOUR WORK, TAKE CARE OF THINGS AT HOME, OR GET ALONG WITH OTHER PEOPLE: SOMEWHAT DIFFICULT
2. FEELING DOWN, DEPRESSED OR HOPELESS: SEVERAL DAYS
SUM OF ALL RESPONSES TO PHQ9 QUESTIONS 1 AND 2: 2
1. LITTLE INTEREST OR PLEASURE IN DOING THINGS: SEVERAL DAYS
1. LITTLE INTEREST OR PLEASURE IN DOING THINGS: SEVERAL DAYS
2. FEELING DOWN, DEPRESSED OR HOPELESS: SEVERAL DAYS
SUM OF ALL RESPONSES TO PHQ9 QUESTIONS 1 AND 2: 2

## 2024-08-28 ASSESSMENT — ENCOUNTER SYMPTOMS
COLOR CHANGE: 0
MYALGIAS: 0
SHORTNESS OF BREATH: 0
BACK PAIN: 0
WEAKNESS: 0
COUGH: 0
JOINT SWELLING: 0
NUMBNESS: 0
LIGHT-HEADEDNESS: 1
FATIGUE: 0
FEVER: 0
ARTHRALGIAS: 0

## 2024-08-28 NOTE — PROGRESS NOTES
[facility] RHEUMATOLOGY     AND INTERNAL MEDICINE    RHEUMATOLOGY PROGRESS NOTE  Eh Lee 69 y.o. female  Chief Complaint   Patient presents with    Rheumatoid Arthritis       SUBJECTIVE  Pt reports she is doing well.   No worsening joint pain.  No new areas of pain.  No swelling.   No recurrence of nodules.     Dealing with some lightheadness and other symptoms for which she has been seeing PCP for testing this summer.  No side effects of medication      Records since last seen reviewed in [facility]  Patient Active Problem List    Diagnosis Date Noted    Tinnitus 10/12/2023    Protein-calorie malnutrition, unspecified severity (Multi) 10/12/2023    3-vessel CAD 09/08/2023    Anemia 09/08/2023    Anxiety 09/08/2023    Bilateral sensorineural hearing loss 09/08/2023    BPPV (benign paroxysmal positional vertigo) 09/08/2023    Cervicalgia 09/08/2023    Depression 09/08/2023    Dysfunction of right eustachian tube 09/08/2023    GERD (gastroesophageal reflux disease) 09/08/2023    Hyperlipidemia, mixed 09/08/2023    Hypertension, benign 09/08/2023    Irritable bowel syndrome with predominant constipation 09/08/2023    Long-term current use of tofacitinib 09/08/2023    Migraine without aura and without status migrainosus, not intractable 09/08/2023    On methotrexate therapy 09/08/2023    Osteoporosis 09/08/2023    Paroxysmal A-fib (Multi) 09/08/2023    Primary osteoarthritis of first carpometacarpal joint of left hand 09/08/2023    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 12 tablet 0    amitriptyline (Elavil) 10 mg tablet TAKE TWO TABLETS BY MOUTH EVERY DAY AT BEDTIME 180 tablet 0    aspirin 325 mg tablet Take 1 tablet (325 mg) by mouth once daily.      buPROPion (Wellbutrin) 75 mg tablet Take 1 tablet (75 mg) by mouth 2 times a day. 180 tablet 1    calcium carbonate-vit D3-min 600 mg calcium- 400 unit tablet Take by mouth. TAKE PER DIRECTED      ciclopirox (Penlac) 8 % solution APPLY TOPICALLY ONCE DAILY AT BEDTIME FOR 6 MONTHS AS INSTRUCTED. 6.6 mL 0    dilTIAZem ER (Tiazac) 240 mg 24 hr capsule Take 1 capsule (240 mg) by mouth once daily in the morning. 90 capsule 1    efinaconazole 10 % solution with applicator Use topically daily for 6 months as instructed 8 mL 1    FLUoxetine (PROzac) 40 mg capsule TAKE 1 CAPSULE BY MOUTH ONCE  DAILY 90 capsule 3    L. acidophilus/Bifid. animalis 15.5 billion cell capsule Take 1 capsule by mouth once daily.      methotrexate (Trexall) 2.5 mg tablet TAKE 4 TABLETS BY MOUTH WEEKLY 48 tablet 3    metroNIDAZOLE (Metrocream) 0.75 % cream Apply topically 2 times a day. to face      multivitamin (Daily Multi-Vitamin) tablet Take 1 tablet by mouth once daily.      omeprazole (PriLOSEC) 40 mg DR capsule TAKE 1 CAPSULE BY MOUTH ONCE  DAILY IN THE MORNING BEFORE A  MEAL 100 capsule 2    polyethylene glycol 3350 (MIRALAX ORAL) Take by mouth. ONCE DAILY PER DIRECTED      rosuvastatin (Crestor) 10 mg tablet Take 1 tablet (10 mg) by mouth once daily. 90 tablet 3    tofacitinib ER (Xeljanz XR) 11 mg tablet extended release 24 hr Take 1 tablet (11 mg) by mouth once daily. Do not crush, chew or split. Swallow whole. 90 tablet 3    triamterene-hydrochlorothiazid (Maxzide-25) 37.5-25 mg tablet TAKE ONE-HALF TABLET BY MOUTH  ONCE DAILY 50 tablet 2    Bacillus subtilis-inulin  "1.5 billion cell-1 gram tablet,chewable TAKE PER DIRECTED      triamcinolone (Kenalog) 0.1 % cream Apply topically 2 times a day. Apply to affected area 1-2 times daily as needed. (Patient not taking: Reported on 2024) 30 g 0     No current facility-administered medications on file prior to visit.     Allergies   Allergen Reactions    Amoxicillin Rash     Social History     Tobacco Use    Smoking status: Former     Current packs/day: 0.00     Types: Cigarettes     Quit date:      Years since quittin.6    Smokeless tobacco: Never   Vaping Use    Vaping status: Never Used   Substance Use Topics    Alcohol use: Yes     Comment: SOCIAL    Drug use: Yes     Comment: XANAX     Review of Systems   Constitutional:  Negative for fatigue and fever.   Respiratory:  Negative for cough and shortness of breath.    Cardiovascular:  Negative for leg swelling.   Musculoskeletal:  Negative for arthralgias, back pain, gait problem, joint swelling and myalgias.   Skin:  Negative for color change and rash.   Neurological:  Positive for light-headedness. Negative for weakness and numbness.       PHYSICAL EXAM  /90   Pulse 73   Temp 36.2 °C (97.2 °F)   Ht 1.803 m (5' 11\")   Wt 63.3 kg (139 lb 9.6 oz)   SpO2 100%   BMI 19.47 kg/m²   Depression: Not at risk (2024)    PHQ-2     PHQ-2 Score: 2     Physical Exam  Vitals reviewed.   Constitutional:       General: She is not in acute distress.     Appearance: Normal appearance.   HENT:      Head: Normocephalic and atraumatic.   Eyes:      General: No scleral icterus.     Extraocular Movements: Extraocular movements intact.      Conjunctiva/sclera: Conjunctivae normal.      Pupils: Pupils are equal, round, and reactive to light.   Pulmonary:      Effort: Pulmonary effort is normal. No respiratory distress.   Musculoskeletal:         General: No swelling, tenderness or deformity.      Cervical back: Normal range of motion and neck supple. No tenderness.      Right " lower leg: No edema.      Left lower leg: No edema.      Comments: No synovitis of examined joints   Skin:     Coloration: Skin is not pale.      Findings: No erythema or rash.   Neurological:      General: No focal deficit present.      Mental Status: She is alert and oriented to person, place, and time. Mental status is at baseline.      Gait: Gait normal.   Psychiatric:         Mood and Affect: Mood normal.     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  Continue meds.  Labs ordered today to monitor for increased disease activity, end organ manifestations and to monitor for any drug toxicities due to chronic medications    Orders:    CBC and Auto Differential; Future    Comprehensive Metabolic Panel; Future    C-Reactive Protein; Future    Sedimentation Rate; Future    Long-term current use of tofacitinib         On methotrexate therapy         Need for vaccination    Orders:    Flu vaccine, trivalent, preservative free, HIGH-DOSE, age 65y+ (Fluzone)  will need COVID vaccine    Follow up: __4___months          Patient was identified as a fall risk. Risk prevention instructions provided.

## 2024-08-28 NOTE — PATIENT INSTRUCTIONS
"It was a pleasure to see you today  Please call if your symptoms worsen  Please review your summary for education and reminders.  Follow up at your next appointment.    If you had labs/xrays done today, you will be able to view on [employer].   We will contact you when the results are reviewed for further discussion.  Please note that you may receive your results before I have had a chance to review.  Please know I will be contacting you for discussion  Homegoing instructions for all patient  A healthy lifestyle helps chronic diseases  These are all the goals you should strive to improve your overall health   Blood pressure <130/85   BMI of <30 or waist circumference that is 1/2 of your height   Fasting blood sugar <107 (if you are diabetic, aim for an A1c <6.4%_   LDL cholesterol <130   Avoid smoking   Manage your stress   Get your preventive exams   Get your immunizations   What else for RA?    Any type of exercise is better than nothing.  Whether you do cardio, walking, lift weights or yoga, all can help in improving physical function.  Occupational and physical therapy can improve physical function and decrease pain by providing tools and techniques to improve your day.  We can do a referral if you haven't seen one yet  Braces and splints for affected joints can also help  If your gait is affected, strongly recommend assistive devices like canes or walkers.  We don't want to risk injuries from falls.  Can Diet help?   Consider the Mediterranean diet  There are some foods that are considered \"inflammatory\"  Look up anti-inflammatory diets for a list of foods to avoid.  No dietary supplements help unfortunately.  Work on getting to a normal weight/BMI.  This will lessen the stress on your joints.  What else?   Acupuncture   Massage therapy   Apply heat to affected joints  We do not recommend chiropractic care as it has not been shown to help in RA.  If you smoke, stop     (From 2022ACR guidelines for exercise, rehab " and diet in RA)         Ways to Help Prevent Falls at Home    Quick Tips   ? Ask for help if you need it. Most people want to help!   ? Get up slowly after sitting or laying down   ? Wear a medical alert device or keep cell phone in your pocket   ? Use night lights, especially areas near a bathroom   ? Keep the items you use often within reach on a small stool or end table   ? Use an assistive device such as walker or cane, as directed by provider/physical therapy   ? Use a non-slip mat and grab bars in your bathroom. Look for home health sections for best options     Other Areas to Focus On   ? Exercise and nutrition: Regular exercise or taking a falls prevention class are great ways improve strength and balance. Don’t forget to stay hydrated and bring a snack!   ? Medicine side effects: Some medicines can make you sleepy or dizzy, which could cause a fall. Ask your healthcare provider about the side effects your medicines could cause. Be sure to let them know if you take any vitamins or supplements as well.   ? Tripping hazards: Remove items you could trip on, such as loose mats, rugs, cords, and clutter. Wear closed toe shoes with rubber soles.   ? Health and wellness: Get regular checkups with your healthcare provider, plus routine vision and hearing screenings. Talk with your healthcare provider about:   o Your medicines and the possible side effects - bring them in a bag if that is easier!   o Problems with balance or feeling dizzy   o Ways to promote bone health, such as Vitamin D and calcium supplements   o Questions or concerns about falling     *Ask your healthcare team if you have questions     [facility], 2022

## 2024-08-28 NOTE — ASSESSMENT & PLAN NOTE
On methotrexate and xeljanz with good control of symptoms   Meets remission criteria on evaluation today.  Continue meds.  Labs ordered today to monitor for increased disease activity, end organ manifestations and to monitor for any drug toxicities due to chronic medications    Orders:    CBC and Auto Differential; Future    Comprehensive Metabolic Panel; Future    C-Reactive Protein; Future    Sedimentation Rate; Future

## 2024-08-29 DIAGNOSIS — M05.79 RHEUMATOID ARTHRITIS OF MULTIPLE SITES WITHOUT ORGAN OR SYSTEM INVOLVEMENT WITH POSITIVE RHEUMATOID FACTOR (MULTI): Primary | ICD-10-CM

## 2024-09-15 DIAGNOSIS — I10 HYPERTENSION, UNSPECIFIED TYPE: ICD-10-CM

## 2024-09-16 RX ORDER — DILTIAZEM HYDROCHLORIDE 240 MG/1
240 CAPSULE, EXTENDED RELEASE ORAL EVERY MORNING
Qty: 90 CAPSULE | Refills: 0 | Status: SHIPPED | OUTPATIENT
Start: 2024-09-16

## 2024-09-30 DIAGNOSIS — B35.1 ONYCHOMYCOSIS: ICD-10-CM

## 2024-10-01 RX ORDER — CICLOPIROX 80 MG/ML
SOLUTION TOPICAL NIGHTLY
Qty: 6.6 ML | Refills: 0 | Status: SHIPPED | OUTPATIENT
Start: 2024-10-01

## 2024-10-03 ENCOUNTER — TELEPHONE (OUTPATIENT)
Dept: PRIMARY CARE | Facility: CLINIC | Age: 69
End: 2024-10-03
Payer: MEDICARE

## 2024-10-17 ENCOUNTER — APPOINTMENT (OUTPATIENT)
Dept: RADIOLOGY | Facility: CLINIC | Age: 69
End: 2024-10-17
Payer: MEDICARE

## 2024-10-17 DIAGNOSIS — Z12.31 SCREENING MAMMOGRAM FOR BREAST CANCER: ICD-10-CM

## 2024-10-18 ENCOUNTER — HOSPITAL ENCOUNTER (OUTPATIENT)
Dept: RADIOLOGY | Facility: CLINIC | Age: 69
Discharge: HOME | End: 2024-10-18
Payer: MEDICARE

## 2024-10-18 VITALS — HEIGHT: 72 IN | BODY MASS INDEX: 18.83 KG/M2 | WEIGHT: 139 LBS

## 2024-10-18 DIAGNOSIS — Z12.31 SCREENING MAMMOGRAM FOR BREAST CANCER: ICD-10-CM

## 2024-10-18 PROCEDURE — 77063 BREAST TOMOSYNTHESIS BI: CPT

## 2024-10-18 PROCEDURE — 77063 BREAST TOMOSYNTHESIS BI: CPT | Performed by: RADIOLOGY

## 2024-10-18 PROCEDURE — 77067 SCR MAMMO BI INCL CAD: CPT | Performed by: RADIOLOGY

## 2024-10-31 ENCOUNTER — LAB (OUTPATIENT)
Dept: LAB | Facility: LAB | Age: 69
End: 2024-10-31
Payer: MEDICARE

## 2024-10-31 ENCOUNTER — APPOINTMENT (OUTPATIENT)
Dept: NEUROLOGY | Facility: CLINIC | Age: 69
End: 2024-10-31
Payer: MEDICARE

## 2024-10-31 VITALS
BODY MASS INDEX: 20.16 KG/M2 | SYSTOLIC BLOOD PRESSURE: 164 MMHG | HEART RATE: 71 BPM | HEIGHT: 71 IN | WEIGHT: 144 LBS | DIASTOLIC BLOOD PRESSURE: 84 MMHG

## 2024-10-31 DIAGNOSIS — R41.3 MEMORY CHANGES: ICD-10-CM

## 2024-10-31 PROCEDURE — 1159F MED LIST DOCD IN RCRD: CPT | Performed by: PSYCHIATRY & NEUROLOGY

## 2024-10-31 PROCEDURE — 82746 ASSAY OF FOLIC ACID SERUM: CPT

## 2024-10-31 PROCEDURE — 83921 ORGANIC ACID SINGLE QUANT: CPT

## 2024-10-31 PROCEDURE — 1123F ACP DISCUSS/DSCN MKR DOCD: CPT | Performed by: PSYCHIATRY & NEUROLOGY

## 2024-10-31 PROCEDURE — 99204 OFFICE O/P NEW MOD 45 MIN: CPT | Performed by: PSYCHIATRY & NEUROLOGY

## 2024-10-31 PROCEDURE — 3079F DIAST BP 80-89 MM HG: CPT | Performed by: PSYCHIATRY & NEUROLOGY

## 2024-10-31 PROCEDURE — 3008F BODY MASS INDEX DOCD: CPT | Performed by: PSYCHIATRY & NEUROLOGY

## 2024-10-31 PROCEDURE — 84425 ASSAY OF VITAMIN B-1: CPT

## 2024-10-31 PROCEDURE — 36415 COLL VENOUS BLD VENIPUNCTURE: CPT

## 2024-10-31 PROCEDURE — 3077F SYST BP >= 140 MM HG: CPT | Performed by: PSYCHIATRY & NEUROLOGY

## 2024-10-31 ASSESSMENT — SLU MENTAL STATUS EXAMINATION (SLUMS)
TOTAL SCORE: 18
PICK OUT TRIANGLE: 2
PROVIDE NAMES OF ANIMALS: 2
CALCULATE MONEY SPENT AND MONEY LEFT: 0
DRAW A CLOCK: 2
PATIENT HAS COMPLETED HIGH SCHOOL OR ABOVE: YES
WHAT YEAR IS THIS: 1
BACKWARD DIGIT SPAN: 1
REMEMBER AND REPEAT FIVE WORDS: 4
WHAT STATE ARE WE IN: 1
WHAT DAY OF THE WEEK IS TODAY: 1
QUESTIONS ABOUT STORY: 4

## 2024-10-31 ASSESSMENT — ENCOUNTER SYMPTOMS: NERVOUS/ANXIOUS: 1

## 2024-11-01 LAB — FOLATE SERPL-MCNC: >24 NG/ML

## 2024-11-05 LAB — METHYLMALONATE SERPL-SCNC: 0.56 UMOL/L (ref 0–0.4)

## 2024-11-06 LAB — VIT B1 PYROPHOSHATE BLD-SCNC: 124 NMOL/L (ref 70–180)

## 2024-11-07 ENCOUNTER — TELEPHONE (OUTPATIENT)
Dept: NEUROLOGY | Facility: CLINIC | Age: 69
End: 2024-11-07
Payer: MEDICARE

## 2024-11-07 NOTE — TELEPHONE ENCOUNTER
----- Message from Liv Anderson sent at 11/7/2024  1:17 PM EST -----  Please let the patient know that her precursor protein to B12 is elevated and this is consistent with a B12 deficiency.  I would like her to start 2000 mcg daily for 2 weeks of sublingual B12 then continue with 1000 mcg daily. Hopefully this will help her symptoms.

## 2024-11-11 DIAGNOSIS — F51.01 PRIMARY INSOMNIA: ICD-10-CM

## 2024-11-13 DIAGNOSIS — I10 HYPERTENSION, UNSPECIFIED TYPE: ICD-10-CM

## 2024-11-14 DIAGNOSIS — K21.9 GASTROESOPHAGEAL REFLUX DISEASE WITHOUT ESOPHAGITIS: ICD-10-CM

## 2024-11-14 DIAGNOSIS — I10 HYPERTENSION, UNSPECIFIED TYPE: ICD-10-CM

## 2024-11-15 RX ORDER — DILTIAZEM HYDROCHLORIDE 240 MG/1
240 CAPSULE, EXTENDED RELEASE ORAL EVERY MORNING
Qty: 90 CAPSULE | Refills: 0 | Status: SHIPPED | OUTPATIENT
Start: 2024-11-15

## 2024-11-15 RX ORDER — OMEPRAZOLE 40 MG/1
40 CAPSULE, DELAYED RELEASE ORAL
Qty: 90 CAPSULE | Refills: 0 | Status: SHIPPED | OUTPATIENT
Start: 2024-11-15

## 2024-11-18 RX ORDER — TRIAMTERENE/HYDROCHLOROTHIAZID 37.5-25 MG
0.5 TABLET ORAL DAILY
Qty: 45 TABLET | Refills: 0 | Status: SHIPPED | OUTPATIENT
Start: 2024-11-18

## 2024-11-18 RX ORDER — AMITRIPTYLINE HYDROCHLORIDE 10 MG/1
20 TABLET, FILM COATED ORAL NIGHTLY
Qty: 60 TABLET | Refills: 0 | Status: SHIPPED | OUTPATIENT
Start: 2024-11-18 | End: 2024-11-22 | Stop reason: SDUPTHER

## 2024-11-22 ENCOUNTER — APPOINTMENT (OUTPATIENT)
Dept: PRIMARY CARE | Facility: CLINIC | Age: 69
End: 2024-11-22
Payer: MEDICARE

## 2024-11-22 VITALS — DIASTOLIC BLOOD PRESSURE: 72 MMHG | SYSTOLIC BLOOD PRESSURE: 120 MMHG

## 2024-11-22 DIAGNOSIS — F41.9 ANXIETY: ICD-10-CM

## 2024-11-22 DIAGNOSIS — I10 HYPERTENSION, UNSPECIFIED TYPE: ICD-10-CM

## 2024-11-22 DIAGNOSIS — Z00.00 MEDICARE ANNUAL WELLNESS VISIT, SUBSEQUENT: Primary | ICD-10-CM

## 2024-11-22 DIAGNOSIS — F32.A DEPRESSION, UNSPECIFIED DEPRESSION TYPE: ICD-10-CM

## 2024-11-22 DIAGNOSIS — F51.01 PRIMARY INSOMNIA: ICD-10-CM

## 2024-11-22 RX ORDER — BUPROPION HYDROCHLORIDE 75 MG/1
75 TABLET ORAL 2 TIMES DAILY
Qty: 180 TABLET | Refills: 1 | Status: SHIPPED | OUTPATIENT
Start: 2024-11-22

## 2024-11-22 RX ORDER — AMITRIPTYLINE HYDROCHLORIDE 25 MG/1
25 TABLET, FILM COATED ORAL NIGHTLY
Qty: 90 TABLET | Refills: 1 | Status: SHIPPED | OUTPATIENT
Start: 2024-11-22 | End: 2025-05-21

## 2024-11-22 RX ORDER — DILTIAZEM HYDROCHLORIDE 240 MG/1
240 CAPSULE, EXTENDED RELEASE ORAL EVERY MORNING
Qty: 90 CAPSULE | Refills: 1 | Status: SHIPPED | OUTPATIENT
Start: 2024-11-22

## 2024-11-22 RX ORDER — ALPRAZOLAM 0.5 MG/1
0.5 TABLET ORAL NIGHTLY PRN
Qty: 12 TABLET | Refills: 0 | Status: SHIPPED | OUTPATIENT
Start: 2024-11-22

## 2024-11-22 ASSESSMENT — ACTIVITIES OF DAILY LIVING (ADL)
DRESSING: INDEPENDENT
BATHING: INDEPENDENT

## 2024-11-22 ASSESSMENT — ENCOUNTER SYMPTOMS
OCCASIONAL FEELINGS OF UNSTEADINESS: 0
LOSS OF SENSATION IN FEET: 0
DEPRESSION: 0

## 2024-11-22 NOTE — PROGRESS NOTES
"Subjective   Patient ID: Eh Lee is a 69 y.o. female who presents for Follow-up.  HPI   Patient is here for routine follow up.    She is struggling since having to put her dog down ~2-3 weeks ago. Continues to have restless leg symptoms at night. Doing well otherwise. Taking all of her medications as prescribed. Following with Neurology, has upcoming MRI.     Questionnaires reviewed, hx reviewed.    Review of Systems  10 point ros negative aside form HPI    Objective   /72     Physical Exam    Physical Exam:  General:  Pleasant and cooperative. No apparent distress.  HEENT:  Normocephalic, atraumatic, mucus membranes moist.   Neck:  Trachea midline.  No JVD.    Chest:  Clear to auscultation bilaterally. No wheezes, rales, or rhonchi.  CV:  Regular rate and rhythm.  Positive S1/S2.   Abdomen: Bowel sounds present in all four quadrants, abdomen is soft, non-tender, non-distended.  Extremities:  No lower extremity edema or cyanosis.   Neurological:  AAOx3. No focal deficits.    Assessment/Plan   #well adult/medicare wellness visit  - Counseled continued efforts on healthy lifestyle modification including balanced diet, and continued exercise for >5 minutes  - counseled age appropriate vaccines and preventative measures  - ACP reviewed, HCPOA confirmed    #Onychomycosis  - Continue topical antifungal    #Anxiety/Depression  #restless legs  - continue fluoxetine 40mg daily  - Continue Wellbutrin 75mg BID  - lost follow up to support group for people who lost spouses, patient  passed away 10/2021, feels well supported with family members and dog  - Using Alprazolam sparingly ~1-2x/month.  - UDS and CSA (5/2024)  - reviewed [place]AR[place], no concerning findings  - continue amitriptyline, increase to 25mg nightly. Add magnesium supplement    #palpitations, SOB  #Paroxysmal Afib   Hx of episode of PAF \"years ago\"  - cardiac event monitor ordered for further investigation. She remains in regular rhythm during " office visit today    #HLD  #HTN  - CACS for risk stratification, demonstrates some plaque buildup in LAD an Lcx  - continue rosuvastatin 10mg daily     #RA   #osteoporosis   #mild anemia, suspected 2/2 RA  Maintained on Xeljanz, Methotrexate, Folic Acid  DEXA, declines bisphosphonate therapy. Continue calcium-vitD  - Follows with Dr. Flores     #former smoker  Quit >20 years ago    #concern for memory impairment  - Following with Dr. Anderson, has upcoming MRI imaging  - taking B12 supplement    #Health maintenance:   Vaccines: UTD - COVID, Flu, Tdap, Pneumonia, Shingles and RSV   Screening: Colonoscopy done 2022, 10 year f/u advised. Mammogram (10/24). Follows with ophthalmology.   Labs: Obtain today     RTC 6 months, sooner PRN    Jerrell Hanks, DO

## 2024-11-29 ENCOUNTER — HOSPITAL ENCOUNTER (OUTPATIENT)
Dept: RADIOLOGY | Facility: CLINIC | Age: 69
Discharge: HOME | End: 2024-11-29
Payer: MEDICARE

## 2024-11-29 DIAGNOSIS — R41.3 MEMORY CHANGES: ICD-10-CM

## 2024-11-29 PROCEDURE — 70551 MRI BRAIN STEM W/O DYE: CPT

## 2024-12-11 ENCOUNTER — LAB (OUTPATIENT)
Dept: LAB | Facility: LAB | Age: 69
End: 2024-12-11
Payer: MEDICARE

## 2024-12-11 DIAGNOSIS — M05.79 RHEUMATOID ARTHRITIS OF MULTIPLE SITES WITHOUT ORGAN OR SYSTEM INVOLVEMENT WITH POSITIVE RHEUMATOID FACTOR (MULTI): ICD-10-CM

## 2024-12-11 LAB
ALBUMIN SERPL BCP-MCNC: 4.2 G/DL (ref 3.4–5)
ALP SERPL-CCNC: 72 U/L (ref 33–136)
ALT SERPL W P-5'-P-CCNC: 13 U/L (ref 7–45)
ANION GAP SERPL CALC-SCNC: 11 MMOL/L (ref 10–20)
AST SERPL W P-5'-P-CCNC: 22 U/L (ref 9–39)
BASOPHILS # BLD AUTO: 0.03 X10*3/UL (ref 0–0.1)
BASOPHILS NFR BLD AUTO: 0.7 %
BILIRUB SERPL-MCNC: 0.3 MG/DL (ref 0–1.2)
BUN SERPL-MCNC: 15 MG/DL (ref 6–23)
CALCIUM SERPL-MCNC: 9.7 MG/DL (ref 8.6–10.3)
CHLORIDE SERPL-SCNC: 103 MMOL/L (ref 98–107)
CO2 SERPL-SCNC: 31 MMOL/L (ref 21–32)
CREAT SERPL-MCNC: 0.83 MG/DL (ref 0.5–1.05)
CRP SERPL-MCNC: <0.1 MG/DL
EGFRCR SERPLBLD CKD-EPI 2021: 77 ML/MIN/1.73M*2
EOSINOPHIL # BLD AUTO: 0.07 X10*3/UL (ref 0–0.7)
EOSINOPHIL NFR BLD AUTO: 1.7 %
ERYTHROCYTE [DISTWIDTH] IN BLOOD BY AUTOMATED COUNT: 16.2 % (ref 11.5–14.5)
ERYTHROCYTE [SEDIMENTATION RATE] IN BLOOD BY WESTERGREN METHOD: 3 MM/H (ref 0–30)
GLUCOSE SERPL-MCNC: 87 MG/DL (ref 74–99)
HCT VFR BLD AUTO: 31 % (ref 36–46)
HGB BLD-MCNC: 9.7 G/DL (ref 12–16)
IMM GRANULOCYTES # BLD AUTO: 0.02 X10*3/UL (ref 0–0.7)
IMM GRANULOCYTES NFR BLD AUTO: 0.5 % (ref 0–0.9)
LYMPHOCYTES # BLD AUTO: 0.75 X10*3/UL (ref 1.2–4.8)
LYMPHOCYTES NFR BLD AUTO: 18.5 %
MCH RBC QN AUTO: 30.5 PG (ref 26–34)
MCHC RBC AUTO-ENTMCNC: 31.3 G/DL (ref 32–36)
MCV RBC AUTO: 98 FL (ref 80–100)
MONOCYTES # BLD AUTO: 0.57 X10*3/UL (ref 0.1–1)
MONOCYTES NFR BLD AUTO: 14 %
NEUTROPHILS # BLD AUTO: 2.62 X10*3/UL (ref 1.2–7.7)
NEUTROPHILS NFR BLD AUTO: 64.6 %
NRBC BLD-RTO: 0 /100 WBCS (ref 0–0)
PLATELET # BLD AUTO: 322 X10*3/UL (ref 150–450)
POTASSIUM SERPL-SCNC: 4.5 MMOL/L (ref 3.5–5.3)
PROT SERPL-MCNC: 6.8 G/DL (ref 6.4–8.2)
RBC # BLD AUTO: 3.18 X10*6/UL (ref 4–5.2)
SODIUM SERPL-SCNC: 140 MMOL/L (ref 136–145)
WBC # BLD AUTO: 4.1 X10*3/UL (ref 4.4–11.3)

## 2024-12-11 PROCEDURE — 36415 COLL VENOUS BLD VENIPUNCTURE: CPT

## 2024-12-11 PROCEDURE — 85025 COMPLETE CBC W/AUTO DIFF WBC: CPT

## 2024-12-11 PROCEDURE — 86140 C-REACTIVE PROTEIN: CPT

## 2024-12-11 PROCEDURE — 85652 RBC SED RATE AUTOMATED: CPT

## 2024-12-11 PROCEDURE — 80053 COMPREHEN METABOLIC PANEL: CPT

## 2024-12-20 DIAGNOSIS — B35.1 ONYCHOMYCOSIS: ICD-10-CM

## 2024-12-20 RX ORDER — CICLOPIROX 80 MG/ML
SOLUTION TOPICAL NIGHTLY
Qty: 6.6 ML | Refills: 0 | Status: SHIPPED | OUTPATIENT
Start: 2024-12-20

## 2025-01-02 DIAGNOSIS — F51.01 PRIMARY INSOMNIA: ICD-10-CM

## 2025-01-02 DIAGNOSIS — F41.9 ANXIETY: ICD-10-CM

## 2025-01-02 DIAGNOSIS — E78.5 DYSLIPIDEMIA: ICD-10-CM

## 2025-01-02 DIAGNOSIS — R00.2 PALPITATIONS: Primary | ICD-10-CM

## 2025-01-02 DIAGNOSIS — I10 HYPERTENSION, UNSPECIFIED TYPE: ICD-10-CM

## 2025-01-02 DIAGNOSIS — F32.A DEPRESSION, UNSPECIFIED DEPRESSION TYPE: ICD-10-CM

## 2025-01-02 DIAGNOSIS — K21.9 GASTROESOPHAGEAL REFLUX DISEASE WITHOUT ESOPHAGITIS: ICD-10-CM

## 2025-01-03 RX ORDER — AMITRIPTYLINE HYDROCHLORIDE 25 MG/1
25 TABLET, FILM COATED ORAL NIGHTLY
Qty: 90 TABLET | Refills: 1 | Status: SHIPPED | OUTPATIENT
Start: 2025-01-03 | End: 2025-07-02

## 2025-01-03 RX ORDER — TRIAMTERENE/HYDROCHLOROTHIAZID 37.5-25 MG
1 TABLET ORAL DAILY
Qty: 90 TABLET | Refills: 1 | Status: SHIPPED | OUTPATIENT
Start: 2025-01-03 | End: 2025-07-02

## 2025-01-03 RX ORDER — FLUOXETINE HYDROCHLORIDE 40 MG/1
40 CAPSULE ORAL DAILY
Qty: 90 CAPSULE | Refills: 1 | Status: SHIPPED | OUTPATIENT
Start: 2025-01-03 | End: 2025-07-02

## 2025-01-03 RX ORDER — BUPROPION HYDROCHLORIDE 75 MG/1
75 TABLET ORAL 2 TIMES DAILY
Qty: 180 TABLET | Refills: 1 | Status: SHIPPED | OUTPATIENT
Start: 2025-01-03 | End: 2025-07-02

## 2025-01-03 RX ORDER — DILTIAZEM HYDROCHLORIDE 240 MG/1
240 CAPSULE, COATED, EXTENDED RELEASE ORAL DAILY
Qty: 90 CAPSULE | Refills: 1 | Status: SHIPPED | OUTPATIENT
Start: 2025-01-03 | End: 2025-07-02

## 2025-01-03 RX ORDER — ROSUVASTATIN CALCIUM 10 MG/1
10 TABLET, COATED ORAL DAILY
Qty: 90 TABLET | Refills: 1 | Status: SHIPPED | OUTPATIENT
Start: 2025-01-03 | End: 2025-07-02

## 2025-01-03 RX ORDER — OMEPRAZOLE 40 MG/1
40 CAPSULE, DELAYED RELEASE ORAL DAILY
Qty: 90 CAPSULE | Refills: 1 | Status: SHIPPED | OUTPATIENT
Start: 2025-01-03

## 2025-01-04 DIAGNOSIS — M05.79 RHEUMATOID ARTHRITIS OF MULTIPLE SITES WITHOUT ORGAN OR SYSTEM INVOLVEMENT WITH POSITIVE RHEUMATOID FACTOR (MULTI): ICD-10-CM

## 2025-01-05 RX ORDER — METHOTREXATE 2.5 MG/1
10 TABLET ORAL WEEKLY
Qty: 48 TABLET | Refills: 4 | Status: SHIPPED | OUTPATIENT
Start: 2025-01-05 | End: 2026-01-05

## 2025-01-15 PROBLEM — M54.2 CERVICALGIA: Status: RESOLVED | Noted: 2023-09-08 | Resolved: 2025-01-15

## 2025-01-15 PROBLEM — G43.009 MIGRAINE WITHOUT AURA AND WITHOUT STATUS MIGRAINOSUS, NOT INTRACTABLE: Status: RESOLVED | Noted: 2023-09-08 | Resolved: 2025-01-15

## 2025-01-15 PROBLEM — H69.91 DYSFUNCTION OF RIGHT EUSTACHIAN TUBE: Status: RESOLVED | Noted: 2023-09-08 | Resolved: 2025-01-15

## 2025-01-15 PROBLEM — H81.10 BPPV (BENIGN PAROXYSMAL POSITIONAL VERTIGO): Status: RESOLVED | Noted: 2023-09-08 | Resolved: 2025-01-15

## 2025-01-16 ENCOUNTER — APPOINTMENT (OUTPATIENT)
Dept: RHEUMATOLOGY | Facility: CLINIC | Age: 70
End: 2025-01-16
Payer: MEDICARE

## 2025-01-16 VITALS
SYSTOLIC BLOOD PRESSURE: 140 MMHG | HEIGHT: 71 IN | TEMPERATURE: 98.3 F | DIASTOLIC BLOOD PRESSURE: 62 MMHG | WEIGHT: 144.4 LBS | OXYGEN SATURATION: 98 % | HEART RATE: 65 BPM | BODY MASS INDEX: 20.22 KG/M2

## 2025-01-16 DIAGNOSIS — M05.79 RHEUMATOID ARTHRITIS OF MULTIPLE SITES WITHOUT ORGAN OR SYSTEM INVOLVEMENT WITH POSITIVE RHEUMATOID FACTOR (MULTI): Primary | ICD-10-CM

## 2025-01-16 DIAGNOSIS — Z79.622 LONG-TERM CURRENT USE OF TOFACITINIB: ICD-10-CM

## 2025-01-16 DIAGNOSIS — Z79.631 ON METHOTREXATE THERAPY: ICD-10-CM

## 2025-01-16 DIAGNOSIS — I48.0 PAROXYSMAL A-FIB (MULTI): ICD-10-CM

## 2025-01-16 PROCEDURE — 1158F ADVNC CARE PLAN TLK DOCD: CPT | Performed by: INTERNAL MEDICINE

## 2025-01-16 PROCEDURE — 3008F BODY MASS INDEX DOCD: CPT | Performed by: INTERNAL MEDICINE

## 2025-01-16 PROCEDURE — 3077F SYST BP >= 140 MM HG: CPT | Performed by: INTERNAL MEDICINE

## 2025-01-16 PROCEDURE — 99214 OFFICE O/P EST MOD 30 MIN: CPT | Performed by: INTERNAL MEDICINE

## 2025-01-16 PROCEDURE — 3078F DIAST BP <80 MM HG: CPT | Performed by: INTERNAL MEDICINE

## 2025-01-16 PROCEDURE — 1123F ACP DISCUSS/DSCN MKR DOCD: CPT | Performed by: INTERNAL MEDICINE

## 2025-01-16 PROCEDURE — 1160F RVW MEDS BY RX/DR IN RCRD: CPT | Performed by: INTERNAL MEDICINE

## 2025-01-16 PROCEDURE — 1036F TOBACCO NON-USER: CPT | Performed by: INTERNAL MEDICINE

## 2025-01-16 PROCEDURE — 1159F MED LIST DOCD IN RCRD: CPT | Performed by: INTERNAL MEDICINE

## 2025-01-16 ASSESSMENT — ENCOUNTER SYMPTOMS
BACK PAIN: 0
FATIGUE: 0
MYALGIAS: 0
NUMBNESS: 0
COUGH: 0
SHORTNESS OF BREATH: 0
ARTHRALGIAS: 1
WEAKNESS: 0
COLOR CHANGE: 0
JOINT SWELLING: 0
FEVER: 0

## 2025-01-16 ASSESSMENT — PATIENT HEALTH QUESTIONNAIRE - PHQ9
2. FEELING DOWN, DEPRESSED OR HOPELESS: NOT AT ALL
1. LITTLE INTEREST OR PLEASURE IN DOING THINGS: NOT AT ALL
SUM OF ALL RESPONSES TO PHQ9 QUESTIONS 1 & 2: 0

## 2025-01-16 NOTE — PROGRESS NOTES
[facility] RHEUMATOLOGY     AND INTERNAL MEDICINE    RHEUMATOLOGY PROGRESS NOTE    Eh Lee 69 y.o. female  :  1955  PCP:  Jerrell Hanks DO    Chief Complaint   Patient presents with    Rheumatoid Arthritis       SUBJECTIVE  Arthritis is doing well.  Pt having trouble with the patient assistance program for Xeljanz --still has enough medication for a couple of months.  No nodules.  No swelling or stiffness      [facility] since last seen [facility] and [facility]  Patient Active Problem List    Diagnosis Date Noted    Tinnitus 10/12/2023    Protein-calorie malnutrition, unspecified severity (Multi) 10/12/2023    3-vessel CAD 2023    Anemia 2023    Anxiety 2023    Bilateral sensorineural hearing loss 2023    Depression 2023    GERD (gastroesophageal reflux disease) 2023    Hyperlipidemia, mixed 2023    Hypertension, benign 2023    Irritable bowel syndrome with predominant constipation 2023    Long-term current use of tofacitinib 2023    On methotrexate therapy 2023    Osteoporosis 2023    Paroxysmal A-fib (Multi) 2023    Primary osteoarthritis of first carpometacarpal joint of left hand 2023    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 (Multi) 2023    TIA (transient ischemic attack) 2023     Past Medical History:   Diagnosis Date    BPPV (benign paroxysmal positional vertigo) 2023    Cervicalgia 2023    Consumes 2 to 3 servings of caffeine per day     Dysfunction of right eustachian tube 2023    GERD (gastroesophageal reflux disease)     Herpes zoster     Migraine without aura and without status migrainosus, not intractable 2023    Rheumatoid nodule, unspecified site (Multi) 10/07/2022    Rheumatoid nodules     Current Outpatient Medications  on File Prior to Visit   Medication Sig Dispense Refill    ALPRAZolam (Xanax) 0.5 mg tablet Take 1 tablet (0.5 mg) by mouth as needed at bedtime for anxiety. 12 tablet 0    amitriptyline (Elavil) 25 mg tablet Take 1 tablet (25 mg) by mouth once daily at bedtime. 90 tablet 1    aspirin 325 mg tablet Take 1 tablet (325 mg) by mouth once daily.      buPROPion (Wellbutrin) 75 mg tablet Take 1 tablet (75 mg) by mouth 2 times a day. 180 tablet 1    calcium carbonate-vit D3-min 600 mg calcium- 400 unit tablet Take by mouth. TAKE PER DIRECTED      ciclopirox (Penlac) 8 % solution APPLY TOPICALLY ONCE DAILY AT BEDTIME FOR 6 MONTHS AS INSTRUCTED 6.6 mL 0    CYANOCOBALAMIN, VITAMIN B-12, ORAL Place under the tongue.      dilTIAZem CD (Cardizem CD) 240 mg 24 hr capsule Take 1 capsule (240 mg) by mouth once daily. 90 capsule 1    efinaconazole 10 % solution with applicator Use topically daily for 6 months as instructed 8 mL 1    FLUoxetine (PROzac) 40 mg capsule Take 1 capsule (40 mg) by mouth once daily. 90 capsule 1    L. acidophilus/Bifid. animalis 15.5 billion cell capsule Take 1 capsule by mouth once daily.      MAGNESIUM GLYCINATE ORAL Take 3 tablets by mouth once daily at bedtime. 360 MG      methotrexate (Trexall) 2.5 mg tablet Take 4 tablets (10 mg total) by mouth 1 (one) time per week. 48 tablet 4    metroNIDAZOLE (Metrocream) 0.75 % cream Apply topically 2 times a day. to face      multivitamin (Daily Multi-Vitamin) tablet Take 1 tablet by mouth once daily.      omeprazole (PriLOSEC) 40 mg DR capsule Take 1 capsule (40 mg) by mouth once daily. 90 capsule 1    polyethylene glycol 3350 (MIRALAX ORAL) Take by mouth. ONCE DAILY PER DIRECTED      rosuvastatin (Crestor) 10 mg tablet Take 1 tablet (10 mg) by mouth once daily. 90 tablet 1    tofacitinib ER (Xeljanz XR) 11 mg tablet extended release 24 hr Take 1 tablet (11 mg) by mouth once daily. Do not crush, chew or split. Swallow whole. 90 tablet 3     "triamterene-hydrochlorothiazid (Maxzide-25) 37.5-25 mg tablet Take 1 tablet by mouth once daily. 90 tablet 1    [DISCONTINUED] dilTIAZem ER (Tiazac) 240 mg 24 hr capsule Take 1 capsule (240 mg) by mouth once daily in the morning. 90 capsule 1     No current facility-administered medications on file prior to visit.     Allergies   Allergen Reactions    Amoxicillin Rash     Social History     Tobacco Use    Smoking status: Former     Current packs/day: 0.00     Types: Cigarettes     Quit date:      Years since quittin.0    Smokeless tobacco: Never   Vaping Use    Vaping status: Never Used   Substance Use Topics    Alcohol use: Yes     Comment: SOCIAL    Drug use: Yes     Comment: XANAX-Prescribed     Review of Systems   Constitutional:  Negative for fatigue and fever.   Respiratory:  Negative for cough and shortness of breath.    Cardiovascular:  Negative for leg swelling.   Musculoskeletal:  Positive for arthralgias. Negative for back pain, gait problem, joint swelling and myalgias.   Skin:  Negative for color change and rash.   Neurological:  Negative for weakness and numbness.       PHYSICAL EXAM  /62   Pulse 65   Temp 36.8 °C (98.3 °F) (Temporal)   Ht 1.803 m (5' 11\")   Wt 65.5 kg (144 lb 6.4 oz)   SpO2 98%   BMI 20.14 kg/m²   Depression: Not at risk (2025)    PHQ-2     PHQ-2 Score: 0     Physical Exam  Vitals reviewed.   Constitutional:       General: She is not in acute distress.     Appearance: Normal appearance.   HENT:      Head: Normocephalic and atraumatic.   Eyes:      General: No scleral icterus.     Extraocular Movements: Extraocular movements intact.      Conjunctiva/sclera: Conjunctivae normal.      Pupils: Pupils are equal, round, and reactive to light.   Pulmonary:      Effort: Pulmonary effort is normal. No respiratory distress.   Musculoskeletal:         General: No swelling, tenderness or deformity.      Cervical back: Normal range of motion and neck supple. No " tenderness.      Right lower leg: No edema.      Left lower leg: No edema.      Comments: No synovitis of examined joints   Skin:     Coloration: Skin is not pale.      Findings: No erythema or rash.   Neurological:      General: No focal deficit present.      Mental Status: She is alert and oriented to person, place, and time. Mental status is at baseline.      Gait: Gait normal.   Psychiatric:         Mood and Affect: Mood normal.       Lab on 12/11/2024   Component Date Value Ref Range Status    WBC 12/11/2024 4.1 (L)  4.4 - 11.3 x10*3/uL Final    nRBC 12/11/2024 0.0  0.0 - 0.0 /100 WBCs Final    RBC 12/11/2024 3.18 (L)  4.00 - 5.20 x10*6/uL Final    Hemoglobin 12/11/2024 9.7 (L)  12.0 - 16.0 g/dL Final    Hematocrit 12/11/2024 31.0 (L)  36.0 - 46.0 % Final    MCV 12/11/2024 98  80 - 100 fL Final    MCH 12/11/2024 30.5  26.0 - 34.0 pg Final    MCHC 12/11/2024 31.3 (L)  32.0 - 36.0 g/dL Final    RDW 12/11/2024 16.2 (H)  11.5 - 14.5 % Final    Platelets 12/11/2024 322  150 - 450 x10*3/uL Final    Neutrophils % 12/11/2024 64.6  40.0 - 80.0 % Final    Immature Granulocytes %, Automated 12/11/2024 0.5  0.0 - 0.9 % Final    Immature Granulocyte Count (IG) includes promyelocytes, myelocytes and metamyelocytes but does not include bands. Percent differential counts (%) should be interpreted in the context of the absolute cell counts (cells/UL).    Lymphocytes % 12/11/2024 18.5  13.0 - 44.0 % Final    Monocytes % 12/11/2024 14.0  2.0 - 10.0 % Final    Eosinophils % 12/11/2024 1.7  0.0 - 6.0 % Final    Basophils % 12/11/2024 0.7  0.0 - 2.0 % Final    Neutrophils Absolute 12/11/2024 2.62  1.20 - 7.70 x10*3/uL Final    Percent differential counts (%) should be interpreted in the context of the absolute cell counts (cells/uL).    Immature Granulocytes Absolute, Au* 12/11/2024 0.02  0.00 - 0.70 x10*3/uL Final    Lymphocytes Absolute 12/11/2024 0.75 (L)  1.20 - 4.80 x10*3/uL Final    Monocytes Absolute 12/11/2024 0.57  0.10 -  1.00 x10*3/uL Final    Eosinophils Absolute 12/11/2024 0.07  0.00 - 0.70 x10*3/uL Final    Basophils Absolute 12/11/2024 0.03  0.00 - 0.10 x10*3/uL Final    Glucose 12/11/2024 87  74 - 99 mg/dL Final    Sodium 12/11/2024 140  136 - 145 mmol/L Final    Potassium 12/11/2024 4.5  3.5 - 5.3 mmol/L Final    Chloride 12/11/2024 103  98 - 107 mmol/L Final    Bicarbonate 12/11/2024 31  21 - 32 mmol/L Final    Anion Gap 12/11/2024 11  10 - 20 mmol/L Final    Urea Nitrogen 12/11/2024 15  6 - 23 mg/dL Final    Creatinine 12/11/2024 0.83  0.50 - 1.05 mg/dL Final    eGFR 12/11/2024 77  >60 mL/min/1.73m*2 Final    Calculations of estimated GFR are performed using the 2021 CKD-EPI Study Refit equation without the race variable for the IDMS-Traceable creatinine methods.  https://jasn.asnjournals.org/content/early/2021/09/22/ASN.7118521689    Calcium 12/11/2024 9.7  8.6 - 10.3 mg/dL Final    Albumin 12/11/2024 4.2  3.4 - 5.0 g/dL Final    Alkaline Phosphatase 12/11/2024 72  33 - 136 U/L Final    Total Protein 12/11/2024 6.8  6.4 - 8.2 g/dL Final    AST 12/11/2024 22  9 - 39 U/L Final    Bilirubin, Total 12/11/2024 0.3  0.0 - 1.2 mg/dL Final    ALT 12/11/2024 13  7 - 45 U/L Final    Patients treated with Sulfasalazine may generate falsely decreased results for ALT.    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   Xeljanz has provided a lot of relief and provided resolution of rheumatoid nodules.    Continue to monitor.  Reviewed recent labs with patient  Pt to call us if continued issues with pt assistance  program  Orders:    CBC and Auto Differential; Future    Comprehensive Metabolic Panel; Future    C-Reactive Protein; Future    Sedimentation Rate; Future    Long-term current use of tofacitinib  No side effects       On methotrexate therapy  No LFT abnormalities       Paroxysmal A-fib (Multi)  No clinical signs of disease progression since last visit.  No increase in symptoms  Recent labs/imaging/procedures reviewed  Medical records reviewed  Disease process is managed by specialist and PCP.  Meds are refilled by specialist/PCP.  I reviewed that no conflict in medications with the current condition I am treating             Follow up: ___4__months, sooner if change in symptoms    Immunization History   Administered Date(s) Administered    COVID-19, mRNA, LNP-S, PF, 30 mcg/0.3 mL dose 10/21/2021    Flu vaccine (IIV4), preservative free *Check age/dose* 10/18/2018, 10/08/2019, 09/20/2021, 10/03/2023    Flu vaccine, quadrivalent, high-dose, preservative free, age 65y+ (FLUZONE) 09/13/2022    Flu vaccine, trivalent, preservative free, HIGH-DOSE, age 65y+ (Fluzone) 08/28/2024    Influenza, seasonal, injectable 10/19/2020    Pfizer COVID-19 vaccine, 12 years and older, (30mcg/0.3mL) (Comirnaty) 10/06/2023, 10/07/2024    Pfizer COVID-19 vaccine, bivalent, age 12 years and older (30 mcg/0.3 mL) 09/13/2022    Pfizer Gray Cap SARS-CoV-2 04/19/2022    Pfizer Purple Cap SARS-CoV-2 03/11/2021, 04/08/2021    Pneumococcal conjugate vaccine, 13-valent (PREVNAR 13) 10/27/2017    Pneumococcal conjugate vaccine, 20-valent (PREVNAR 20) 10/07/2024    Pneumococcal polysaccharide vaccine, 23-valent, age 2 years and older (PNEUMOVAX 23) 11/07/2020, 11/16/2022    RESPIRATORY SYNCYTIAL VIRUS (RSV), ELIGIBLE PREGNANT PTS, 0.5 ML (ABRYSVO) 10/06/2023    Tdap vaccine, age 7 year and older (BOOSTRIX, ADACEL) 10/22/2015    Zoster vaccine, recombinant, adult (SHINGRIX) 11/07/2020, 04/26/2021    Zoster, live 01/14/2017

## 2025-01-16 NOTE — ASSESSMENT & PLAN NOTE
On xeljanz and methotrexate  and meets remission criteria.   Xeljanz has provided a lot of relief and provided resolution of rheumatoid nodules.    Continue to monitor.  Reviewed recent labs with patient  Pt to call us if continued issues with pt assistance program  Orders:    CBC and Auto Differential; Future    Comprehensive Metabolic Panel; Future    C-Reactive Protein; Future    Sedimentation Rate; Future

## 2025-01-16 NOTE — ASSESSMENT & PLAN NOTE
No clinical signs of disease progression since last visit.  No increase in symptoms  Recent labs/imaging/procedures reviewed  Medical records reviewed  Disease process is managed by specialist and PCP.  Meds are refilled by specialist/PCP.  I reviewed that no conflict in medications with the current condition I am treating

## 2025-01-16 NOTE — PATIENT INSTRUCTIONS
"It was a pleasure to see you today  Please call if your symptoms worsen  Please review your summary for education and reminders.  Follow up at your next appointment.    If you had labs/xrays done today, you will be able to view on [facility].   We will contact you when the results are reviewed for further discussion.  Please note that you may receive your results before I have had a chance to review.  Please know I will be contacting you for discussion  Homegoing instructions for all patient  A healthy lifestyle helps chronic diseases  These are all the goals you should strive to improve your overall health   Blood pressure <130/85   BMI of <30 or waist circumference that is 1/2 of your height   Fasting blood sugar <107 (if you are diabetic, aim for an A1c <6.4%_   LDL cholesterol <130   Avoid smoking   Manage your stress   Get your preventive exams   Get your immunizations   What else for RA?    Any type of exercise is better than nothing.  Whether you do cardio, walking, lift weights or yoga, all can help in improving physical function.  Occupational and physical therapy can improve physical function and decrease pain by providing tools and techniques to improve your day.  We can do a referral if you haven't seen one yet  Braces and splints for affected joints can also help  If your gait is affected, strongly recommend assistive devices like canes or walkers.  We don't want to risk injuries from falls.  Can Diet help?   Consider the Mediterranean diet  There are some foods that are considered \"inflammatory\"  Look up anti-inflammatory diets for a list of foods to avoid.  No dietary supplements help unfortunately.  Work on getting to a normal weight/BMI.  This will lessen the stress on your joints.  What else?   Acupuncture   Massage therapy   Apply heat to affected joints  We do not recommend chiropractic care as it has not been shown to help in RA.  If you smoke, stop     (From 2022ACR guidelines for exercise, rehab " and diet in RA)

## 2025-01-19 DIAGNOSIS — I10 HYPERTENSION, UNSPECIFIED TYPE: ICD-10-CM

## 2025-01-20 RX ORDER — TRIAMTERENE/HYDROCHLOROTHIAZID 37.5-25 MG
0.5 TABLET ORAL DAILY
Qty: 45 TABLET | Refills: 0 | Status: SHIPPED | OUTPATIENT
Start: 2025-01-20

## 2025-02-12 ENCOUNTER — TELEPHONE (OUTPATIENT)
Dept: PRIMARY CARE | Facility: CLINIC | Age: 70
End: 2025-02-12
Payer: MEDICARE

## 2025-02-12 DIAGNOSIS — B35.1 ONYCHOMYCOSIS: ICD-10-CM

## 2025-02-13 DIAGNOSIS — B35.1 ONYCHOMYCOSIS: ICD-10-CM

## 2025-02-13 RX ORDER — CICLOPIROX 80 MG/ML
SOLUTION TOPICAL NIGHTLY
Qty: 6.6 ML | Refills: 1 | Status: SHIPPED | OUTPATIENT
Start: 2025-02-13

## 2025-04-02 ENCOUNTER — APPOINTMENT (OUTPATIENT)
Dept: NEUROLOGY | Facility: CLINIC | Age: 70
End: 2025-04-02
Payer: MEDICARE

## 2025-04-02 VITALS
SYSTOLIC BLOOD PRESSURE: 155 MMHG | HEIGHT: 71 IN | BODY MASS INDEX: 19.84 KG/M2 | DIASTOLIC BLOOD PRESSURE: 79 MMHG | HEART RATE: 71 BPM | WEIGHT: 141.7 LBS

## 2025-04-02 DIAGNOSIS — R41.3 MEMORY CHANGES: Primary | ICD-10-CM

## 2025-04-02 PROCEDURE — 1159F MED LIST DOCD IN RCRD: CPT | Performed by: PSYCHIATRY & NEUROLOGY

## 2025-04-02 PROCEDURE — 3008F BODY MASS INDEX DOCD: CPT | Performed by: PSYCHIATRY & NEUROLOGY

## 2025-04-02 PROCEDURE — 3077F SYST BP >= 140 MM HG: CPT | Performed by: PSYCHIATRY & NEUROLOGY

## 2025-04-02 PROCEDURE — 99214 OFFICE O/P EST MOD 30 MIN: CPT | Performed by: PSYCHIATRY & NEUROLOGY

## 2025-04-02 PROCEDURE — 1126F AMNT PAIN NOTED NONE PRSNT: CPT | Performed by: PSYCHIATRY & NEUROLOGY

## 2025-04-02 PROCEDURE — 1123F ACP DISCUSS/DSCN MKR DOCD: CPT | Performed by: PSYCHIATRY & NEUROLOGY

## 2025-04-02 PROCEDURE — 3078F DIAST BP <80 MM HG: CPT | Performed by: PSYCHIATRY & NEUROLOGY

## 2025-04-02 PROCEDURE — 1036F TOBACCO NON-USER: CPT | Performed by: PSYCHIATRY & NEUROLOGY

## 2025-04-02 ASSESSMENT — PAIN SCALES - GENERAL: PAINLEVEL_OUTOF10: 0-NO PAIN

## 2025-04-02 ASSESSMENT — ENCOUNTER SYMPTOMS: NERVOUS/ANXIOUS: 1

## 2025-04-02 NOTE — PROGRESS NOTES
"Subjective   Eh Lee is a 70 y.o. female who comes in for follow up of cognitive changes.    She is now on B12 and reports that his helping.  She states her psych medications were adjusted a little bit and sleep is better. Still having a lot of anxiety.      Review of Systems   Psychiatric/Behavioral:  The patient is nervous/anxious.    All other systems reviewed and are negative.      Objective   /79 (BP Location: Right arm, Patient Position: Sitting, BP Cuff Size: Adult)   Pulse 71   Ht 1.803 m (5' 11\")   Wt 64.3 kg (141 lb 11.2 oz)   BMI 19.76 kg/m²   Neurological Exam  Physical Exam    Lab Results   Component Value Date    [id number] 350 05/23/2024        MR brain wo IV contrast    Result Date: 12/2/2024  Interpreted By:  Nikunj Matias and Booth Cameron STUDY: MR BRAIN WO IV CONTRAST;  11/29/2024 3:12 pm   INDICATION: Signs/Symptoms: Progressive difficulty with word finding, forgetfulness of where she placed certain objects. Symptoms have worsened since losing . Evaluate for underlying structural etiology..   ,R41.3 Other amnesia   COMPARISON: CT head 02/15/2018, MR brain 01/29/2016   ACCESSION NUMBER(S): [id number]   ORDERING CLINICIAN: TETE POST   TECHNIQUE: Axial T2, FLAIR, DWI, gradient echo T2 and sagittal and coronal T1 weighted images of brain were acquired.   FINDINGS: There is no evidence of diffusion restriction to suggest acute infarction. There is a curvilinear cortical/subcortical DWI hyperintense focus within the superior left frontal lobe (series 7, image 54) without corroborative ADC hypointensity. Moreover, on remote MRI examination there was previous diffusion restriction signal abnormality in this region. As such, this is favored to represent an element of T2 shine through from remote insult.   No evidence of recent extra-axial or intraparenchymal hemorrhage. No intracranial mass effect. There is relatively similar appearance of mild burden of subcortical, " periventricular, and deep white matter T2/FLAIR hyperintensities throughout the bilateral cerebral hemispheres.   Diffuse prominence of sulci and ventricular system, reflective of mild age-related parenchymal volume loss. No extra-axial fluid collection.   The visualized paranasal and mastoid sinuses appear clear. There is no evidence of intraorbital pathology.       1. No acute infarct, recent hemorrhage, or intracranial mass effect. Element of T2 shine through suggested within the cortical/subcortical aspect of the superior left frontal lobe that appears to have been present on remote comparison MRI examination. 2. Similar appearance of mild burden of nonspecific T2/FLAIR white matter hyperintensities within the bilateral cerebral hemispheres. This distribution and morphology raises the possibility of demyelinating disease in addition to chronic small vessel ischemic change. 3. Mild age-related parenchymal volume loss which is unchanged from prior.   I personally reviewed the images/study and I agree with the findings as stated. This study was interpreted at [place], [place].   MACRO: None   Signed by: Nikunj Matias 12/2/2024 3:28 PM Dictation workstation:   [id number]      I personally reviewed the images of her MRI brain. She has a few periventricular white matter changes that are perpendicular to the ventricle however findings are overall unchanged since 2016 image and she denies any history suggestive of demyelinating events.     Reviewed results of her prior blood work B12, MMA, B1, folate.     Assessment/Plan   Ms. Lee is a 69 year old woman presenting to the neurology clinic today for initial evaluation of memory loss. Evaluate showed a 18/30 on the slum test however suspect that testing is not fully valid as anxiety played a role in her performance. Blood work showed elevated MMA and she is now on B12 supplement with some improvement.  Still having a lot of  anxiety and recommend she discuss increasing her Wellbutrin or switching to another medication with her PCP at her follow up in May.    Will refer for Neuro-psych testing to try to get a better understanding if cognitive changes are all anxiety related or if there is findings concerning for underlying neurodegenerative disease.    MRI findings showing a few white matter changes that are atypical for vascular disease however stable from 2016 and there is no history suggestive of demyelinating events.  Could consider repeat imaging with MS protocol depending on Neuro-psych results.     Liv Anderson, DO

## 2025-04-10 DIAGNOSIS — B35.1 ONYCHOMYCOSIS: ICD-10-CM

## 2025-04-10 RX ORDER — CICLOPIROX 80 MG/ML
SOLUTION TOPICAL
Qty: 6.6 ML | Refills: 0 | Status: SHIPPED | OUTPATIENT
Start: 2025-04-10

## 2025-04-27 DIAGNOSIS — E78.5 DYSLIPIDEMIA: ICD-10-CM

## 2025-04-27 DIAGNOSIS — F32.A DEPRESSION, UNSPECIFIED DEPRESSION TYPE: ICD-10-CM

## 2025-04-28 RX ORDER — ROSUVASTATIN CALCIUM 10 MG/1
10 TABLET, COATED ORAL DAILY
Qty: 90 TABLET | Refills: 1 | Status: SHIPPED | OUTPATIENT
Start: 2025-04-28

## 2025-04-28 RX ORDER — BUPROPION HYDROCHLORIDE 75 MG/1
75 TABLET ORAL 2 TIMES DAILY
Qty: 180 TABLET | Refills: 1 | Status: SHIPPED | OUTPATIENT
Start: 2025-04-28

## 2025-05-12 ENCOUNTER — TELEPHONE (OUTPATIENT)
Dept: RHEUMATOLOGY | Facility: CLINIC | Age: 70
End: 2025-05-12
Payer: MEDICARE

## 2025-05-14 ENCOUNTER — TELEPHONE (OUTPATIENT)
Dept: PRIMARY CARE | Facility: CLINIC | Age: 70
End: 2025-05-14
Payer: MEDICARE

## 2025-05-14 ENCOUNTER — TELEPHONE (OUTPATIENT)
Dept: RHEUMATOLOGY | Facility: CLINIC | Age: 70
End: 2025-05-14
Payer: MEDICARE

## 2025-05-14 DIAGNOSIS — M05.79 RHEUMATOID ARTHRITIS OF MULTIPLE SITES WITHOUT ORGAN OR SYSTEM INVOLVEMENT WITH POSITIVE RHEUMATOID FACTOR (MULTI): ICD-10-CM

## 2025-05-14 RX ORDER — TOFACITINIB 11 MG/1
11 TABLET, FILM COATED, EXTENDED RELEASE ORAL DAILY
Qty: 90 TABLET | Refills: 3 | Status: SHIPPED | OUTPATIENT
Start: 2025-05-14 | End: 2026-05-14

## 2025-05-14 NOTE — TELEPHONE ENCOUNTER
Patient left two messages stating Marguerite had been trying to reach our office regarding rx refill-Xeljanz.    I called patient and notified a prescription was sent today at 12:16 pm.  Patient states she appreciated the call back and was walking outside.  Patient states she will check with her pharmacy.  Patient told to call our office with any questions or concerns.  Patient began to cry and thanked our office.

## 2025-05-23 ENCOUNTER — APPOINTMENT (OUTPATIENT)
Dept: PRIMARY CARE | Facility: CLINIC | Age: 70
End: 2025-05-23
Payer: MEDICARE

## 2025-05-23 VITALS — SYSTOLIC BLOOD PRESSURE: 125 MMHG | BODY MASS INDEX: 20.5 KG/M2 | DIASTOLIC BLOOD PRESSURE: 75 MMHG | WEIGHT: 147 LBS

## 2025-05-23 DIAGNOSIS — Z79.899 CONTROLLED SUBSTANCE AGREEMENT SIGNED: Primary | ICD-10-CM

## 2025-05-23 DIAGNOSIS — E55.9 VITAMIN D DEFICIENCY: ICD-10-CM

## 2025-05-23 DIAGNOSIS — F41.9 ANXIETY: ICD-10-CM

## 2025-05-23 DIAGNOSIS — E78.5 DYSLIPIDEMIA: ICD-10-CM

## 2025-05-23 DIAGNOSIS — F51.01 PRIMARY INSOMNIA: ICD-10-CM

## 2025-05-23 DIAGNOSIS — Z12.31 BREAST CANCER SCREENING BY MAMMOGRAM: ICD-10-CM

## 2025-05-23 DIAGNOSIS — M05.59: ICD-10-CM

## 2025-05-23 DIAGNOSIS — I10 HYPERTENSION, UNSPECIFIED TYPE: ICD-10-CM

## 2025-05-23 LAB
25(OH)D3+25(OH)D2 SERPL-MCNC: 44 NG/ML (ref 30–100)
ALBUMIN SERPL-MCNC: 4.5 G/DL (ref 3.6–5.1)
ALP SERPL-CCNC: 85 U/L (ref 37–153)
ALT SERPL-CCNC: 12 U/L (ref 6–29)
ANION GAP SERPL CALCULATED.4IONS-SCNC: 10 MMOL/L (CALC) (ref 7–17)
AST SERPL-CCNC: 21 U/L (ref 10–35)
BILIRUB SERPL-MCNC: 0.3 MG/DL (ref 0.2–1.2)
BUN SERPL-MCNC: 15 MG/DL (ref 7–25)
CALCIUM SERPL-MCNC: 9.5 MG/DL (ref 8.6–10.4)
CHLORIDE SERPL-SCNC: 102 MMOL/L (ref 98–110)
CHOLEST SERPL-MCNC: 172 MG/DL
CHOLEST/HDLC SERPL: 1.7 (CALC)
CO2 SERPL-SCNC: 27 MMOL/L (ref 20–32)
CREAT SERPL-MCNC: 0.77 MG/DL (ref 0.5–1.05)
EGFRCR SERPLBLD CKD-EPI 2021: 83 ML/MIN/1.73M2
ERYTHROCYTE [DISTWIDTH] IN BLOOD BY AUTOMATED COUNT: 16 % (ref 11–15)
GLUCOSE SERPL-MCNC: 85 MG/DL (ref 65–99)
HCT VFR BLD AUTO: 31.6 % (ref 35–45)
HDLC SERPL-MCNC: 103 MG/DL
HGB BLD-MCNC: 9.8 G/DL (ref 11.7–15.5)
LDLC SERPL CALC-MCNC: 59 MG/DL (CALC)
MCH RBC QN AUTO: 29.8 PG (ref 27–33)
MCHC RBC AUTO-ENTMCNC: 31 G/DL (ref 32–36)
MCV RBC AUTO: 96 FL (ref 80–100)
NONHDLC SERPL-MCNC: 69 MG/DL (CALC)
PLATELET # BLD AUTO: 308 THOUSAND/UL (ref 140–400)
PMV BLD REES-ECKER: 9.7 FL (ref 7.5–12.5)
POTASSIUM SERPL-SCNC: 4.6 MMOL/L (ref 3.5–5.3)
PROT SERPL-MCNC: 7.4 G/DL (ref 6.1–8.1)
RBC # BLD AUTO: 3.29 MILLION/UL (ref 3.8–5.1)
SODIUM SERPL-SCNC: 139 MMOL/L (ref 135–146)
TRIGL SERPL-MCNC: 35 MG/DL
TSH SERPL-ACNC: 1.98 MIU/L (ref 0.4–4.5)
WBC # BLD AUTO: 4.2 THOUSAND/UL (ref 3.8–10.8)

## 2025-05-23 PROCEDURE — 3074F SYST BP LT 130 MM HG: CPT | Performed by: STUDENT IN AN ORGANIZED HEALTH CARE EDUCATION/TRAINING PROGRAM

## 2025-05-23 PROCEDURE — 99214 OFFICE O/P EST MOD 30 MIN: CPT | Performed by: STUDENT IN AN ORGANIZED HEALTH CARE EDUCATION/TRAINING PROGRAM

## 2025-05-23 PROCEDURE — G2211 COMPLEX E/M VISIT ADD ON: HCPCS | Performed by: STUDENT IN AN ORGANIZED HEALTH CARE EDUCATION/TRAINING PROGRAM

## 2025-05-23 PROCEDURE — 3078F DIAST BP <80 MM HG: CPT | Performed by: STUDENT IN AN ORGANIZED HEALTH CARE EDUCATION/TRAINING PROGRAM

## 2025-05-23 PROCEDURE — 1170F FXNL STATUS ASSESSED: CPT | Performed by: STUDENT IN AN ORGANIZED HEALTH CARE EDUCATION/TRAINING PROGRAM

## 2025-05-23 PROCEDURE — 1159F MED LIST DOCD IN RCRD: CPT | Performed by: STUDENT IN AN ORGANIZED HEALTH CARE EDUCATION/TRAINING PROGRAM

## 2025-05-23 PROCEDURE — 1160F RVW MEDS BY RX/DR IN RCRD: CPT | Performed by: STUDENT IN AN ORGANIZED HEALTH CARE EDUCATION/TRAINING PROGRAM

## 2025-05-23 PROCEDURE — 1036F TOBACCO NON-USER: CPT | Performed by: STUDENT IN AN ORGANIZED HEALTH CARE EDUCATION/TRAINING PROGRAM

## 2025-05-23 RX ORDER — ALPRAZOLAM 0.5 MG/1
0.5 TABLET ORAL NIGHTLY PRN
Qty: 12 TABLET | Refills: 0 | Status: SHIPPED | OUTPATIENT
Start: 2025-05-23

## 2025-05-23 RX ORDER — AMITRIPTYLINE HYDROCHLORIDE 50 MG/1
50 TABLET, FILM COATED ORAL NIGHTLY
Qty: 90 TABLET | Refills: 1 | Status: SHIPPED | OUTPATIENT
Start: 2025-05-23 | End: 2025-11-19

## 2025-05-23 ASSESSMENT — ACTIVITIES OF DAILY LIVING (ADL)
GROCERY_SHOPPING: INDEPENDENT
DOING_HOUSEWORK: INDEPENDENT
DRESSING: INDEPENDENT
MANAGING_FINANCES: INDEPENDENT
TAKING_MEDICATION: INDEPENDENT
BATHING: INDEPENDENT

## 2025-05-23 ASSESSMENT — PATIENT HEALTH QUESTIONNAIRE - PHQ9
SUM OF ALL RESPONSES TO PHQ9 QUESTIONS 1 AND 2: 6
1. LITTLE INTEREST OR PLEASURE IN DOING THINGS: NEARLY EVERY DAY
2. FEELING DOWN, DEPRESSED OR HOPELESS: NEARLY EVERY DAY

## 2025-05-23 NOTE — PROGRESS NOTES
"Subjective   Patient ID: Eh Lee is a 70 y.o. female who presents for Follow-up and Medicare Annual Wellness Visit Subsequent.  HPI   Patient is here for follow up visit.    She continues to grieve her husbands loss. She also had to put her dog down in the fall. She still has a very good support system with her son and fellow members from her [employer] group. She is taking medications as prescribed without issues. She has been using alprazolam very sparingly.     No other complaints or concerns at this time. Did not start oral medication for toenail fungus, will discuss with rheumatology first.    Review of Systems  10 point ros negative aside form HPI    Objective   /75   Wt 66.7 kg (147 lb)   BMI 20.50 kg/m²     Physical Exam    Physical Exam:  General:  Pleasant and cooperative. No apparent distress.  HEENT:  Normocephalic, atraumatic, mucus membranes moist.   Neck:  Trachea midline.  No JVD.    Chest:  Clear to auscultation bilaterally. No wheezes, rales, or rhonchi.  CV:  Regular rate and rhythm.  Positive S1/S2.   Abdomen: Bowel sounds present in all four quadrants, abdomen is soft, non-tender, non-distended.  Extremities:  No lower extremity edema or cyanosis.   Neurological:  AAOx3. No focal deficits.    Assessment/Plan   #Anxiety/Depression  #restless legs  - continue fluoxetine 40mg daily  - Continue Wellbutrin 75mg BID  - lost follow up to support group for people who lost spouses, patient  passed away 10/2021, feels well supported with family members and dog  - Using Alprazolam sparingly ~1-2x/month.  - UDS and CSA (5/2025)  - reviewed OAR[place], no concerning findings  - continue amitriptyline 50mg nightly    #palpitations, SOB  #Paroxysmal Afib   Hx of episode of PAF \"years ago\"  - cardiac event monitor ordered for further investigation. She remains in regular rhythm during office visit today    #HLD  #HTN  - CACS for risk stratification, demonstrates some plaque buildup in " LAD an Lcx  - continue rosuvastatin 10mg daily     #RA   #osteoporosis   #mild anemia, suspected 2/2 RA  Maintained on Xeljanz, Methotrexate, Folic Acid  DEXA, declines bisphosphonate therapy. Continue calcium-vitD  - Follows with Dr. Flores     #former smoker  Quit >20 years ago    Health maintenance:   Vaccines: COVID, Flu, Tdap (2015), Pneumonia (UTD), Shingles (x2) and RSV (UTD)  Screening: Colonoscopy done 2022 (repeat 2032). Mammogram (10/24). Follows with ophthalmology. Previous DEXA, declines therapy for OP.  Labs: Obtain today     RTC 6 months for wellness/physical, sooner PRN    Jerrell Hanks, DO

## 2025-05-24 LAB
AMPHETAMINES UR QL: NEGATIVE NG/ML
BARBITURATES UR QL: NEGATIVE NG/ML
BENZODIAZ UR QL: NEGATIVE NG/ML
BZE UR QL: NEGATIVE NG/ML
CREAT UR-MCNC: 44.7 MG/DL
FENTANYL UR QL SCN: NEGATIVE NG/ML
METHADONE UR QL: NEGATIVE NG/ML
OPIATES UR QL: NEGATIVE NG/ML
OXIDANTS UR QL: NEGATIVE MCG/ML
OXYCODONE UR QL: NEGATIVE NG/ML
PH UR: 7.6 [PH] (ref 4.5–9)
QUEST NOTES AND COMMENTS: NORMAL
THC UR QL: NEGATIVE NG/ML

## 2025-05-29 ENCOUNTER — APPOINTMENT (OUTPATIENT)
Dept: RHEUMATOLOGY | Facility: CLINIC | Age: 70
End: 2025-05-29
Payer: MEDICARE

## 2025-05-29 VITALS
OXYGEN SATURATION: 99 % | DIASTOLIC BLOOD PRESSURE: 58 MMHG | HEART RATE: 57 BPM | TEMPERATURE: 97.7 F | SYSTOLIC BLOOD PRESSURE: 124 MMHG | HEIGHT: 71 IN | BODY MASS INDEX: 20.23 KG/M2 | WEIGHT: 144.5 LBS

## 2025-05-29 DIAGNOSIS — Z79.622 LONG-TERM CURRENT USE OF TOFACITINIB: ICD-10-CM

## 2025-05-29 DIAGNOSIS — Z79.631 ON METHOTREXATE THERAPY: ICD-10-CM

## 2025-05-29 DIAGNOSIS — M05.79 RHEUMATOID ARTHRITIS OF MULTIPLE SITES WITHOUT ORGAN OR SYSTEM INVOLVEMENT WITH POSITIVE RHEUMATOID FACTOR (MULTI): Primary | ICD-10-CM

## 2025-05-29 PROCEDURE — 3074F SYST BP LT 130 MM HG: CPT | Performed by: INTERNAL MEDICINE

## 2025-05-29 PROCEDURE — 3078F DIAST BP <80 MM HG: CPT | Performed by: INTERNAL MEDICINE

## 2025-05-29 PROCEDURE — 3008F BODY MASS INDEX DOCD: CPT | Performed by: INTERNAL MEDICINE

## 2025-05-29 PROCEDURE — 1160F RVW MEDS BY RX/DR IN RCRD: CPT | Performed by: INTERNAL MEDICINE

## 2025-05-29 PROCEDURE — 1036F TOBACCO NON-USER: CPT | Performed by: INTERNAL MEDICINE

## 2025-05-29 PROCEDURE — 99214 OFFICE O/P EST MOD 30 MIN: CPT | Performed by: INTERNAL MEDICINE

## 2025-05-29 PROCEDURE — 1159F MED LIST DOCD IN RCRD: CPT | Performed by: INTERNAL MEDICINE

## 2025-05-29 ASSESSMENT — ENCOUNTER SYMPTOMS
COLOR CHANGE: 0
BACK PAIN: 0
FATIGUE: 0
EYES NEGATIVE: 1
FEVER: 0
MYALGIAS: 0
ARTHRALGIAS: 0
PSYCHIATRIC NEGATIVE: 1
ENDOCRINE NEGATIVE: 1
COUGH: 0
SHORTNESS OF BREATH: 0
NUMBNESS: 0
GASTROINTESTINAL NEGATIVE: 1
WEAKNESS: 0
JOINT SWELLING: 0

## 2025-05-29 ASSESSMENT — PATIENT HEALTH QUESTIONNAIRE - PHQ9
SUM OF ALL RESPONSES TO PHQ9 QUESTIONS 1 AND 2: 0
1. LITTLE INTEREST OR PLEASURE IN DOING THINGS: NOT AT ALL
2. FEELING DOWN, DEPRESSED OR HOPELESS: NOT AT ALL

## 2025-05-29 NOTE — PATIENT INSTRUCTIONS
"It was a pleasure to see you today  Please call if your symptoms worsen  Please review your summary for education and reminders.  Follow up at your next appointment.    If you had labs/xrays done today, you will be able to view on Siine.   We will contact you when the results are reviewed for further discussion.  Please note that you may receive your results before I have had a chance to review.  Please know I will be contacting you for discussion  Homegoing instructions for all patient  A healthy lifestyle helps chronic diseases  These are all the goals you should strive to improve your overall health   Blood pressure <130/85   BMI of <30 or waist circumference that is 1/2 of your height   Fasting blood sugar <107 (if you are diabetic, aim for an A1c <6.4%_   LDL cholesterol <130   Avoid smoking   Manage your stress   Get your preventive exams   Get your immunizations   What else for RA?    Any type of exercise is better than nothing.  Whether you do cardio, walking, lift weights or yoga, all can help in improving physical function.  Occupational and physical therapy can improve physical function and decrease pain by providing tools and techniques to improve your day.  We can do a referral if you haven't seen one yet  Braces and splints for affected joints can also help  If your gait is affected, strongly recommend assistive devices like canes or walkers.  We don't want to risk injuries from falls.  Can Diet help?   Consider the Mediterranean diet  There are some foods that are considered \"inflammatory\"  Look up anti-inflammatory diets for a list of foods to avoid.  No dietary supplements help unfortunately.  Work on getting to a normal weight/BMI.  This will lessen the stress on your joints.  What else?   Acupuncture   Massage therapy   Apply heat to affected joints  We do not recommend chiropractic care as it has not been shown to help in RA.  If you smoke, stop     (From 2022ACR guidelines for exercise, rehab " and diet in RA)   Common Emergency Awareness Tips   IS IT A STROKE?   Act FAST and Check for these signs:   FACE Does the face look uneven?   ARM Does one arm drift down?   SPEECH Does their speech sound strange?   TIME Call 9-1-1 at any sign of stroke     Heart Attack Signs   Chest discomfort: Most heart attacks involve discomfort in the center of the chest and lasts more than a few minutes, or goes away and comes back. It can feel like uncomfortable pressure, squeezing, fullness or pain.   Discomfort in upper body: Symptoms can include pain or discomfort in one or both arms, back, neck, jaw or stomach.   Shortness of breath: With or without discomfort.   Other signs: Breaking out in a cold sweat, nausea, or lightheaded.   Remember, MINUTES DO MATTER. If you experience any of these heart attack warning signs, call 9-1-1 to get immediate medical attention!

## 2025-05-29 NOTE — PROGRESS NOTES
[facility] RHEUMATOLOGY     AND INTERNAL MEDICINE    RHEUMATOLOGY PROGRESS NOTE    Eh LYLES Uzl 70 y.o. female  :  1955      Chief Complaint   Patient presents with    Rheumatoid Arthritis       SUBJECTIVE  Pt reports she is doing well.  No joint pain  No swelling.   No recurrence of nodules.  Only issue is the presence of onychomyolysis on her toe that has been present for 18 months.   Pt stated PCP wanted my ok to start lamisil (given ok)      Records since last seen reviewed in [facility], [facility] and [facility]  Patient Active Problem List    Diagnosis Date Noted    Tinnitus 10/12/2023    Protein-calorie malnutrition, unspecified severity (Multi) 10/12/2023    3-vessel CAD 2023    Anemia 2023    Anxiety 2023    Bilateral sensorineural hearing loss 2023    Depression 2023    GERD (gastroesophageal reflux disease) 2023    Hyperlipidemia, mixed 2023    Hypertension, benign 2023    Irritable bowel syndrome with predominant constipation 2023    Long-term current use of tofacitinib 2023    On methotrexate therapy 2023    Osteoporosis 2023    Paroxysmal A-fib (Multi) 2023    Primary osteoarthritis of first carpometacarpal joint of left hand 2023    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 (Multi) 2023    TIA (transient ischemic attack) 2023     Medical History[1]  Medications Ordered Prior to Encounter[2]  RX Allergies[3]  Social History[4]  Review of Systems   Constitutional:  Negative for fatigue and fever.   HENT: Negative.     Eyes: Negative.    Respiratory:  Negative for cough and shortness of breath.    Cardiovascular:  Negative for leg swelling.   Gastrointestinal: Negative.    Endocrine: Negative.    Genitourinary: Negative.    Musculoskeletal:  Negative for arthralgias, back pain,  "gait problem, joint swelling and myalgias.   Skin:  Negative for color change and rash.   Neurological:  Negative for weakness and numbness.   Psychiatric/Behavioral: Negative.         PHYSICAL EXAM  /58   Pulse 57   Temp 36.5 °C (97.7 °F)   Ht 1.803 m (5' 11\")   Wt 65.5 kg (144 lb 8 oz)   SpO2 99%   BMI 20.15 kg/m²   Depression: Not at risk (5/29/2025)    PHQ-2     PHQ-2 Score: 0   Recent Concern: Depression - At risk (5/23/2025)    PHQ-2     PHQ-2 Score: 6     Physical Exam  Vitals reviewed.   Constitutional:       General: She is not in acute distress.     Appearance: Normal appearance.   HENT:      Head: Normocephalic and atraumatic.   Eyes:      General: No scleral icterus.     Extraocular Movements: Extraocular movements intact.      Conjunctiva/sclera: Conjunctivae normal.      Pupils: Pupils are equal, round, and reactive to light.   Pulmonary:      Effort: Pulmonary effort is normal. No respiratory distress.   Musculoskeletal:         General: No swelling, tenderness or deformity.      Cervical back: Normal range of motion and neck supple. No tenderness.      Right lower leg: No edema.      Left lower leg: No edema.      Comments: No synovitis of examined joints   Skin:     Coloration: Skin is not pale.      Findings: No erythema or rash.   Neurological:      General: No focal deficit present.      Mental Status: She is alert and oriented to person, place, and time. Mental status is at baseline.      Gait: Gait normal.   Psychiatric:         Mood and Affect: Mood normal.           Health Maintenance Due   Topic Date Due    Bone Density Scan  Never done    Hepatitis C Screening  Never done    Diabetes Screening  Never done    COVID-19 Vaccine (7 - 2024-25 season) 04/07/2025       Assessment/plan  Assessment & Plan  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 (Multi)  On methotrexate therapy  Long-term current use of tofacitinib  Pt " doing well and meets remission criteria for RA on methotrexate and xeljanz.  Pt to continue meds.   Reviewed recent labs              Follow up: ___4__months, sooner if change in symptoms    Immunization History   Administered Date(s) Administered    COVID-19, mRNA, LNP-S, PF, 30 mcg/0.3 mL dose 10/21/2021    Flu vaccine (IIV4), preservative free *Check age/dose* 10/18/2018, 10/08/2019, 09/20/2021, 10/03/2023    Flu vaccine, quadrivalent, high-dose, preservative free, age 65y+ (FLUZONE) 09/13/2022    Flu vaccine, trivalent, preservative free, HIGH-DOSE, age 65y+ (Fluzone) 08/28/2024    Influenza, seasonal, injectable 10/19/2020    Pfizer COVID-19 vaccine, 12 years and older, (30mcg/0.3mL) (Comirnaty) 10/06/2023, 10/07/2024    Pfizer COVID-19 vaccine, bivalent, age 12 years and older (30 mcg/0.3 mL) 09/13/2022    Pfizer Gray Cap SARS-CoV-2 04/19/2022    Pfizer Purple Cap SARS-CoV-2 03/11/2021, 04/08/2021    Pneumococcal conjugate vaccine, 13-valent (PREVNAR 13) 10/27/2017    Pneumococcal conjugate vaccine, 20-valent (PREVNAR 20) 10/07/2024    Pneumococcal polysaccharide vaccine, 23-valent, age 2 years and older (PNEUMOVAX 23) 11/07/2020, 11/16/2022    RESPIRATORY SYNCYTIAL VIRUS (RSV), ELIGIBLE PREGNANT PTS, 0.5 ML (ABRYSVO) 10/06/2023    Tdap vaccine, age 7 year and older (BOOSTRIX, ADACEL) 10/22/2015    Zoster vaccine, recombinant, adult (SHINGRIX) 11/07/2020, 04/26/2021    Zoster, live 01/14/2017           Lab Results   Component Value Date    GLUCOSE 85 05/23/2025    CALCIUM 9.5 05/23/2025     05/23/2025    K 4.6 05/23/2025    CO2 27 05/23/2025     05/23/2025    BUN 15 05/23/2025    CREATININE 0.77 05/23/2025     Lab Results   Component Value Date    ALT 12 05/23/2025    AST 21 05/23/2025    ALKPHOS 85 05/23/2025    BILITOT 0.3 05/23/2025     Lab Results   Component Value Date    WBC 4.2 05/23/2025    HGB 9.8 (L) 05/23/2025    HCT 31.6 (L) 05/23/2025    MCV 96.0 05/23/2025     05/23/2025             [1]   Past Medical History:  Diagnosis Date    BPPV (benign paroxysmal positional vertigo) 09/08/2023    Cervicalgia 09/08/2023    Consumes 2 to 3 servings of caffeine per day     Dysfunction of right eustachian tube 09/08/2023    GERD (gastroesophageal reflux disease)     Herpes zoster     Migraine without aura and without status migrainosus, not intractable 09/08/2023    Rheumatoid nodule, unspecified site (Multi) 10/07/2022    Rheumatoid nodules   [2]   Current Outpatient Medications on File Prior to Visit   Medication Sig Dispense Refill    ALPRAZolam (Xanax) 0.5 mg tablet Take 1 tablet (0.5 mg) by mouth as needed at bedtime for anxiety. 12 tablet 0    amitriptyline (Elavil) 50 mg tablet Take 1 tablet (50 mg) by mouth once daily at bedtime. 90 tablet 1    aspirin 325 mg tablet Take 1 tablet (325 mg) by mouth once daily.      buPROPion (Wellbutrin) 75 mg tablet Take 1 tablet (75 mg) by mouth 2 times a day. 180 tablet 1    calcium carbonate-vit D3-min 600 mg calcium- 400 unit tablet Take by mouth. TAKE PER DIRECTED      CYANOCOBALAMIN, VITAMIN B-12, ORAL Place under the tongue.      dilTIAZem CD (Cardizem CD) 240 mg 24 hr capsule Take 1 capsule (240 mg) by mouth once daily. 90 capsule 1    FLUoxetine (PROzac) 40 mg capsule Take 1 capsule (40 mg) by mouth once daily. 90 capsule 1    L. acidophilus/Bifid. animalis 15.5 billion cell capsule Take 1 capsule by mouth once daily.      MAGNESIUM GLYCINATE ORAL Take 3 tablets by mouth once daily at bedtime. 360 MG (Patient taking differently: Take 2 tablets by mouth once daily at bedtime. 250 MG)      methotrexate (Trexall) 2.5 mg tablet Take 4 tablets (10 mg total) by mouth 1 (one) time per week. 48 tablet 4    metroNIDAZOLE (Metrocream) 0.75 % cream Apply topically 2 times a day. to face      multivitamin (Daily Multi-Vitamin) tablet Take 1 tablet by mouth once daily.      omeprazole (PriLOSEC) 40 mg DR capsule Take 1 capsule (40 mg) by mouth once daily. 90 capsule 1     polyethylene glycol 3350 (MIRALAX ORAL) Take by mouth. ONCE DAILY PER DIRECTED      rosuvastatin (Crestor) 10 mg tablet Take 1 tablet (10 mg) by mouth once daily. 90 tablet 1    tofacitinib ER (Xeljanz XR) 11 mg tablet extended release 24 hr Take 1 tablet (11 mg) by mouth once daily. Do not crush, chew or split. Swallow whole. 90 tablet 3    triamterene-hydrochlorothiazid (Maxzide-25) 37.5-25 mg tablet TAKE ONE-HALF TABLET BY MOUTH  ONCE DAILY 45 tablet 0    efinaconazole 10 % solution with applicator Use topically daily for 6 months as instructed (Patient not taking: Reported on 2025) 8 mL 1    [DISCONTINUED] ALPRAZolam (Xanax) 0.5 mg tablet Take 1 tablet (0.5 mg) by mouth as needed at bedtime for anxiety. 12 tablet 0    [DISCONTINUED] amitriptyline (Elavil) 25 mg tablet Take 1 tablet (25 mg) by mouth once daily at bedtime. 90 tablet 1    [DISCONTINUED] ciclopirox (Penlac) 8 % solution APPLY TOPICALLY ONCE DAILY AT BEDTIME. USE FOR 6 MONTHS AS INSTRUCTED (Patient not taking: Reported on 2025) 6.6 mL 0     No current facility-administered medications on file prior to visit.   [3]   Allergies  Allergen Reactions    Amoxicillin Rash   [4]   Social History  Tobacco Use    Smoking status: Former     Current packs/day: 0.00     Types: Cigarettes     Quit date:      Years since quittin.4    Smokeless tobacco: Never   Vaping Use    Vaping status: Never Used   Substance Use Topics    Alcohol use: Yes     Comment: SOCIAL    Drug use: Yes     Comment: XANAX-Prescribed

## 2025-05-29 NOTE — ASSESSMENT & PLAN NOTE
Pt doing well and meets remission criteria for RA on methotrexate and xeljanz.  Pt to continue meds.   Reviewed recent labs

## 2025-06-06 DIAGNOSIS — E78.5 DYSLIPIDEMIA: ICD-10-CM

## 2025-06-06 DIAGNOSIS — F32.A DEPRESSION, UNSPECIFIED DEPRESSION TYPE: ICD-10-CM

## 2025-06-06 RX ORDER — BUPROPION HYDROCHLORIDE 75 MG/1
75 TABLET ORAL 2 TIMES DAILY
Qty: 180 TABLET | Refills: 3 | Status: SHIPPED | OUTPATIENT
Start: 2025-06-06

## 2025-06-06 RX ORDER — ROSUVASTATIN CALCIUM 10 MG/1
10 TABLET, COATED ORAL DAILY
Qty: 90 TABLET | Refills: 3 | Status: SHIPPED | OUTPATIENT
Start: 2025-06-06

## 2025-06-16 ENCOUNTER — OFFICE VISIT (OUTPATIENT)
Dept: PRIMARY CARE | Facility: CLINIC | Age: 70
End: 2025-06-16
Payer: MEDICARE

## 2025-06-16 VITALS — DIASTOLIC BLOOD PRESSURE: 63 MMHG | SYSTOLIC BLOOD PRESSURE: 113 MMHG

## 2025-06-16 DIAGNOSIS — R60.0 PEDAL EDEMA: Primary | ICD-10-CM

## 2025-06-16 PROCEDURE — 1160F RVW MEDS BY RX/DR IN RCRD: CPT | Performed by: STUDENT IN AN ORGANIZED HEALTH CARE EDUCATION/TRAINING PROGRAM

## 2025-06-16 PROCEDURE — 3074F SYST BP LT 130 MM HG: CPT | Performed by: STUDENT IN AN ORGANIZED HEALTH CARE EDUCATION/TRAINING PROGRAM

## 2025-06-16 PROCEDURE — 3078F DIAST BP <80 MM HG: CPT | Performed by: STUDENT IN AN ORGANIZED HEALTH CARE EDUCATION/TRAINING PROGRAM

## 2025-06-16 PROCEDURE — 1159F MED LIST DOCD IN RCRD: CPT | Performed by: STUDENT IN AN ORGANIZED HEALTH CARE EDUCATION/TRAINING PROGRAM

## 2025-06-16 PROCEDURE — 1036F TOBACCO NON-USER: CPT | Performed by: STUDENT IN AN ORGANIZED HEALTH CARE EDUCATION/TRAINING PROGRAM

## 2025-06-16 PROCEDURE — 99213 OFFICE O/P EST LOW 20 MIN: CPT | Performed by: STUDENT IN AN ORGANIZED HEALTH CARE EDUCATION/TRAINING PROGRAM

## 2025-06-16 RX ORDER — FUROSEMIDE 20 MG/1
20 TABLET ORAL DAILY PRN
Qty: 30 TABLET | Refills: 0 | Status: SHIPPED | OUTPATIENT
Start: 2025-06-16 | End: 2026-06-16

## 2025-06-16 NOTE — PROGRESS NOTES
Subjective   Patient ID: Eh Lee is a 70 y.o. female who presents for Leg Swelling (Bilateral L.E swelling).  HPI  Eh is here for bilateral pedal edema x~2 weeks.    No new supplements, medication changes. She has been going on less walks because she very recently got a new puppy who is too young for leashed walks at this time.    No change to diet recently, does avoid salt in the diet. She has been elevating her legs. She recently purchased compression stocking and was going to use during the day.    Review of Systems  12-point ROS was reviewed and is negative, unless otherwise noted in HPI    Objective   Vitals:    06/16/25 1413   BP: 113/63      Physical Exam  GEN: alert, conversant, NAD  HEENT: PERRL, EOMI, MMM  NECK: supple, no LAD appreciated  CHEST: CTAB  CV: S1, S2, RRR, no murmurs appreciated  ABD: soft, NT, ND  EXT: trace pedal edema bilaterally. Good pulses, cap refills.   SKIN: warm, dry    Assessment/Plan   #LE swelling, bilaterally  Suspected related to venous insufficiency. No signs/symptoms of CHF.  - wear compression stocking during the day, avoid salt, elevated legs while seated, maintained active lifestlye  - okay to use lasix 20mg PRN persistent foot swelling.     RTC as scheduled, sooner PRN    Jerrell Hanks, DO

## 2025-07-09 DIAGNOSIS — R00.2 PALPITATIONS: ICD-10-CM

## 2025-07-09 DIAGNOSIS — F41.9 ANXIETY: ICD-10-CM

## 2025-07-09 DIAGNOSIS — I10 HYPERTENSION, UNSPECIFIED TYPE: ICD-10-CM

## 2025-07-09 DIAGNOSIS — K21.9 GASTROESOPHAGEAL REFLUX DISEASE WITHOUT ESOPHAGITIS: ICD-10-CM

## 2025-07-09 RX ORDER — OMEPRAZOLE 40 MG/1
40 CAPSULE, DELAYED RELEASE ORAL DAILY
Qty: 90 CAPSULE | Refills: 0 | Status: SHIPPED | OUTPATIENT
Start: 2025-07-09

## 2025-07-09 RX ORDER — FLUOXETINE HYDROCHLORIDE 40 MG/1
40 CAPSULE ORAL DAILY
Qty: 90 CAPSULE | Refills: 0 | Status: SHIPPED | OUTPATIENT
Start: 2025-07-09

## 2025-07-09 RX ORDER — DILTIAZEM HYDROCHLORIDE 240 MG/1
240 CAPSULE, COATED, EXTENDED RELEASE ORAL DAILY
Qty: 90 CAPSULE | Refills: 0 | Status: SHIPPED | OUTPATIENT
Start: 2025-07-09

## 2025-07-09 RX ORDER — TRIAMTERENE AND HYDROCHLOROTHIAZIDE 37.5; 25 MG/1; MG/1
1 TABLET ORAL DAILY
Qty: 90 TABLET | Refills: 0 | Status: SHIPPED | OUTPATIENT
Start: 2025-07-09

## 2025-08-29 ENCOUNTER — OFFICE VISIT (OUTPATIENT)
Dept: PRIMARY CARE | Facility: CLINIC | Age: 70
End: 2025-08-29
Payer: MEDICARE

## 2025-08-29 VITALS — SYSTOLIC BLOOD PRESSURE: 130 MMHG | BODY MASS INDEX: 18.55 KG/M2 | WEIGHT: 133 LBS | DIASTOLIC BLOOD PRESSURE: 74 MMHG

## 2025-08-29 DIAGNOSIS — F41.9 ANXIETY: ICD-10-CM

## 2025-08-29 DIAGNOSIS — G25.81 RESTLESS LEG SYNDROME: Primary | ICD-10-CM

## 2025-08-29 DIAGNOSIS — D50.8 IRON DEFICIENCY ANEMIA SECONDARY TO INADEQUATE DIETARY IRON INTAKE: ICD-10-CM

## 2025-08-29 DIAGNOSIS — F51.01 PRIMARY INSOMNIA: ICD-10-CM

## 2025-08-29 PROCEDURE — 3075F SYST BP GE 130 - 139MM HG: CPT | Performed by: STUDENT IN AN ORGANIZED HEALTH CARE EDUCATION/TRAINING PROGRAM

## 2025-08-29 PROCEDURE — 1160F RVW MEDS BY RX/DR IN RCRD: CPT | Performed by: STUDENT IN AN ORGANIZED HEALTH CARE EDUCATION/TRAINING PROGRAM

## 2025-08-29 PROCEDURE — 99214 OFFICE O/P EST MOD 30 MIN: CPT | Performed by: STUDENT IN AN ORGANIZED HEALTH CARE EDUCATION/TRAINING PROGRAM

## 2025-08-29 PROCEDURE — 1036F TOBACCO NON-USER: CPT | Performed by: STUDENT IN AN ORGANIZED HEALTH CARE EDUCATION/TRAINING PROGRAM

## 2025-08-29 PROCEDURE — 3078F DIAST BP <80 MM HG: CPT | Performed by: STUDENT IN AN ORGANIZED HEALTH CARE EDUCATION/TRAINING PROGRAM

## 2025-08-29 PROCEDURE — 1159F MED LIST DOCD IN RCRD: CPT | Performed by: STUDENT IN AN ORGANIZED HEALTH CARE EDUCATION/TRAINING PROGRAM

## 2025-08-29 RX ORDER — BUSPIRONE HYDROCHLORIDE 5 MG/1
5 TABLET ORAL 2 TIMES DAILY
Qty: 60 TABLET | Refills: 1 | Status: SHIPPED | OUTPATIENT
Start: 2025-08-29 | End: 2025-10-28

## 2025-08-29 RX ORDER — AMITRIPTYLINE HYDROCHLORIDE 50 MG/1
50 TABLET, FILM COATED ORAL NIGHTLY
Qty: 90 TABLET | Refills: 1 | Status: SHIPPED | OUTPATIENT
Start: 2025-08-29 | End: 2026-02-25

## 2025-08-29 RX ORDER — AMITRIPTYLINE HYDROCHLORIDE 50 MG/1
50 TABLET, FILM COATED ORAL NIGHTLY
Qty: 7 TABLET | Refills: 0 | Status: SHIPPED | OUTPATIENT
Start: 2025-08-29 | End: 2025-09-05

## 2025-09-02 LAB
ERYTHROCYTE [DISTWIDTH] IN BLOOD BY AUTOMATED COUNT: 16.6 % (ref 11–15)
HCT VFR BLD AUTO: 34.1 % (ref 35–45)
HGB BLD-MCNC: 10.6 G/DL (ref 11.7–15.5)
IRON SATN MFR SERPL: 30 % (CALC) (ref 16–45)
IRON SERPL-MCNC: 131 MCG/DL (ref 45–160)
MCH RBC QN AUTO: 30.4 PG (ref 27–33)
MCHC RBC AUTO-ENTMCNC: 31.1 G/DL (ref 32–36)
MCV RBC AUTO: 97.7 FL (ref 80–100)
PLATELET # BLD AUTO: 317 THOUSAND/UL (ref 140–400)
PMV BLD REES-ECKER: 9.4 FL (ref 7.5–12.5)
RBC # BLD AUTO: 3.49 MILLION/UL (ref 3.8–5.1)
TIBC SERPL-MCNC: 441 MCG/DL (CALC) (ref 250–450)
VIT B12 SERPL-MCNC: 854 PG/ML (ref 200–1100)
WBC # BLD AUTO: 5.1 THOUSAND/UL (ref 3.8–10.8)

## 2025-09-03 ENCOUNTER — RESULTS FOLLOW-UP (OUTPATIENT)
Dept: PRIMARY CARE | Facility: CLINIC | Age: 70
End: 2025-09-03
Payer: MEDICARE

## 2025-10-01 ENCOUNTER — APPOINTMENT (OUTPATIENT)
Dept: RHEUMATOLOGY | Facility: CLINIC | Age: 70
End: 2025-10-01
Payer: MEDICARE

## 2025-10-08 ENCOUNTER — APPOINTMENT (OUTPATIENT)
Dept: RHEUMATOLOGY | Facility: CLINIC | Age: 70
End: 2025-10-08
Payer: MEDICARE

## 2025-10-21 ENCOUNTER — APPOINTMENT (OUTPATIENT)
Dept: RADIOLOGY | Facility: CLINIC | Age: 70
End: 2025-10-21
Payer: MEDICARE

## 2025-10-24 ENCOUNTER — APPOINTMENT (OUTPATIENT)
Dept: PRIMARY CARE | Facility: CLINIC | Age: 70
End: 2025-10-24
Payer: MEDICARE